# Patient Record
Sex: MALE | Race: WHITE | NOT HISPANIC OR LATINO | Employment: FULL TIME | URBAN - METROPOLITAN AREA
[De-identification: names, ages, dates, MRNs, and addresses within clinical notes are randomized per-mention and may not be internally consistent; named-entity substitution may affect disease eponyms.]

---

## 2017-01-27 ENCOUNTER — TELEPHONE (OUTPATIENT)
Dept: CARDIOLOGY | Facility: MEDICAL CENTER | Age: 67
End: 2017-01-27

## 2017-01-28 NOTE — TELEPHONE ENCOUNTER
----- Message from Kyung Deal, Med Ass't sent at 1/27/2017  2:35 PM PST -----  Regarding: Lab Question   Contact: [phone]  Patient of JW wife Ranjana would like a call back in regards to labs patient did prior to his appointment on 1/20/17 that was rescheduled due to the weather. She would like to know since he is not able to get in to see JW until 5/27/17 if new labs would  be needed. Also has a question regarding testing JW wants EDELMIRA and patient is not able to do it in [place].     Please call after 1PM.    Thank you

## 2017-01-28 NOTE — TELEPHONE ENCOUNTER
1- Please order EDELMIRA's in Epic:  I'm not sure about single or multi level-  2- When the order is done, please give to staff to fax to [facility] (if you think they might do it there)  If it has to be done in [place], wife will have to figure out how.   3- Does he need repeat lab work in May?  Patient not able to get in to see you until then.

## 2017-02-02 DIAGNOSIS — E80.4 GILBERT'S SYNDROME: ICD-10-CM

## 2017-02-02 DIAGNOSIS — N52.9 ERECTILE DYSFUNCTION, UNSPECIFIED ERECTILE DYSFUNCTION TYPE: ICD-10-CM

## 2017-02-21 DIAGNOSIS — R09.89: ICD-10-CM

## 2017-02-21 DIAGNOSIS — N52.01 ERECTILE DYSFUNCTION DUE TO ARTERIAL INSUFFICIENCY: ICD-10-CM

## 2017-02-21 DIAGNOSIS — E78.2 MIXED HYPERLIPIDEMIA: ICD-10-CM

## 2017-02-21 DIAGNOSIS — I48.0 PAF (PAROXYSMAL ATRIAL FIBRILLATION) (HCC): ICD-10-CM

## 2017-02-21 NOTE — TELEPHONE ENCOUNTER
I faxed down the form for NIH study but I still haven't seen results.  Did it get done?  They have their own form.  I will reorder in our system just in case.  What he needs before May are lipids, CBC and CMP and I ordered them. TSH and c RP don't need repeat.  I ordered that too.

## 2017-02-22 NOTE — TELEPHONE ENCOUNTER
I left a message for Ranjana to call back on the phone # below.   Dr. Thorne also sends this message:  Also he needs to tell us pronto if any arrhythmias.

## 2017-02-23 NOTE — TELEPHONE ENCOUNTER
He will get the labs done.    Wife does not know anything about NIH and what you are talking about.  She will remind him about arrhythmias.

## 2017-02-24 NOTE — TELEPHONE ENCOUNTER
Per JW:  KATIE Henderson R.N.        Caller: Unspecified (4 weeks ago)                     [facility].  ([facility])  The question was where to get ABIs I believe.

## 2017-03-01 NOTE — TELEPHONE ENCOUNTER
[facility] does not do EDELMIRA's per tech in the Cardiology dept.  Is this something that you want patient to come to [place] for?

## 2017-03-10 NOTE — TELEPHONE ENCOUNTER
Now I was trying to workup erectile dysfunction anyway and we'll just discuss it again when I see him in [place].

## 2017-03-16 NOTE — TELEPHONE ENCOUNTER
I left a message for Merrel:  No need to worry about getting the EDELMIRA testing done.  Dr. TITUS will discuss with patient in May.

## 2017-05-15 DIAGNOSIS — E80.4 GILBERT'S SYNDROME: ICD-10-CM

## 2017-05-15 DIAGNOSIS — N52.9 ERECTILE DYSFUNCTION, UNSPECIFIED ERECTILE DYSFUNCTION TYPE: ICD-10-CM

## 2017-05-17 ENCOUNTER — TELEPHONE (OUTPATIENT)
Dept: CARDIOLOGY | Facility: MEDICAL CENTER | Age: 67
End: 2017-05-17

## 2017-05-17 NOTE — TELEPHONE ENCOUNTER
----- Message from Rubi Matias R.N. sent at 5/15/2017 10:50 AM PDT -----    Borderline Bilateral PVD. FU visit with you 6/16.

## 2017-08-08 ENCOUNTER — TELEPHONE (OUTPATIENT)
Dept: CARDIOLOGY | Facility: MEDICAL CENTER | Age: 67
End: 2017-08-08

## 2017-08-08 DIAGNOSIS — E78.00 HYPERCHOLESTEROLEMIA: ICD-10-CM

## 2017-08-08 DIAGNOSIS — I48.0 PAF (PAROXYSMAL ATRIAL FIBRILLATION) (HCC): ICD-10-CM

## 2017-08-08 DIAGNOSIS — I45.6 WOLFF-PARKINSON-WHITE (WPW) SYNDROME: ICD-10-CM

## 2017-08-08 NOTE — TELEPHONE ENCOUNTER
----- Message from Vikas Thorne M.D. sent at 8/8/2017  3:14 PM PDT -----  Regarding: RE: wife wants order for cardiac scoring test  This is a perfectly reasonable thing to do to decide if his cholesterol should be treated and I did order it but I'm not sure insurance will cover it. Please let them know.  ----- Message -----     From: Mallory Maki R.N.     Sent: 8/7/2017   2:28 PM       To: Mallory Maki R.N., Vikas Thorne M.D.  Subject: FW: wife wants order for cardiac scoring test      JW,  OK to order this? He sees you 9/1/17.    ----- Message -----     From: Jina Siegel     Sent: 8/7/2017   1:48 PM       To: Mallory Maki R.N.  Subject: wife wants order for cardiac scoring test        JW/Yu      Patient's wife Jil called and wants to order a cardiac scoring test for her . She needs it faxed to [facility]. She can be reached at [phone] afternoons only.

## 2017-08-09 NOTE — TELEPHONE ENCOUNTER
I left message for Jil:  We used all the codes we could to cover the test, so if insurance is denying, that's all we can do.  Call back for questions.

## 2017-08-09 NOTE — TELEPHONE ENCOUNTER
Jina Haque R.N.                   ARIELA/Antoinette       Patient's wife Jil would like CT order faxed to [facility] in [place]. She said she spoke to Medicare to see if the test is covered, he said the code she gave him couldn't be found and told her to have the office call them to determine if they would cover the test. Medicare #[phone]. Jil can be reached at [phone] during afternoon hours only.

## 2017-08-23 DIAGNOSIS — E78.00 HYPERCHOLESTEROLEMIA: ICD-10-CM

## 2017-08-23 DIAGNOSIS — I45.6 WOLFF-PARKINSON-WHITE (WPW) SYNDROME: ICD-10-CM

## 2017-08-23 DIAGNOSIS — I48.0 PAF (PAROXYSMAL ATRIAL FIBRILLATION) (HCC): ICD-10-CM

## 2017-09-01 ENCOUNTER — OFFICE VISIT (OUTPATIENT)
Dept: CARDIOLOGY | Facility: CLINIC | Age: 67
End: 2017-09-01
Payer: MEDICARE

## 2017-09-01 VITALS
SYSTOLIC BLOOD PRESSURE: 142 MMHG | DIASTOLIC BLOOD PRESSURE: 80 MMHG | HEART RATE: 50 BPM | BODY MASS INDEX: 28.98 KG/M2 | HEIGHT: 76 IN | OXYGEN SATURATION: 98 % | WEIGHT: 238 LBS

## 2017-09-01 DIAGNOSIS — I48.0 PAF (PAROXYSMAL ATRIAL FIBRILLATION) (HCC): ICD-10-CM

## 2017-09-01 DIAGNOSIS — E78.00 HYPERCHOLESTEROLEMIA: ICD-10-CM

## 2017-09-01 DIAGNOSIS — I45.6 WOLFF-PARKINSON-WHITE (WPW) SYNDROME: ICD-10-CM

## 2017-09-01 DIAGNOSIS — I25.10 CORONARY ARTERY CALCIFICATION SEEN ON CAT SCAN: ICD-10-CM

## 2017-09-01 DIAGNOSIS — I73.9 PERIPHERAL ARTERIAL DISEASE (HCC): ICD-10-CM

## 2017-09-01 DIAGNOSIS — E78.2 MIXED HYPERLIPIDEMIA: ICD-10-CM

## 2017-09-01 PROCEDURE — 99213 OFFICE O/P EST LOW 20 MIN: CPT | Performed by: INTERNAL MEDICINE

## 2017-09-01 NOTE — LETTER
[facility]   [address] [place] [address]  Phone: [phone]  Fax: [phone]              Rhett Barrios  1950    Encounter Date: 9/1/2017    Vikas Thorne M.D.          PROGRESS NOTE:  Subjective:   Rhett Barrios is a 67 y.o. male who presents today for annual follow-up of his paroxysmal atrial fibrillation.  In the interval he has had a coronary CT scan which showed significant coronary calcification and is described below.  His ABIs were borderline abnormal but actually either stabilized or improved with exercise and he has no claudication. He has had no cardiac symptoms at all.    Past Medical History:   Diagnosis Date   • Coronary artery calcification seen on CAT scan 8/22/2017    Agatston score of 336 in left main, left anterior descending and circumflex predominately left anterior descending with none in the right coronary artery   • Peripheral arterial disease (CMS-HCC) 5/12/2017    Borderline ABIs of 0.8 on the right and 0.9 on the left, 0.9 bilaterally after exercise   • Unspecified sleep apnea 09/11/2012    evaluated by Dr. Gutierrez, [facility]   • Hyperlipidemia 2010   • Fatigue    • Gilbert's syndrome    • PAF (paroxysmal atrial fibrillation) (CMS-[facility])     remote, age 37 without recurrence, WPW found at that time, never ablated   • Taylor-Parkinson-White (WPW) syndrome     Presented with atrial fibrillation and found to have long refractory period Bright bundle at age 37 with negative coronary arteriogram then. 1st evaluated by Dr. Caal in 2010 and Dr. Doan subsequently. Referred for evaluation to Dr. Terrell at [facility], 12/2/2014,  but then decided not to go and has had no recurrent atrial fibrillation since age 37 nor any other atrial tachyarrhythmias     Past Surgical History:   Procedure Laterality Date   • KNEE ARTHROSCOPY Right    • KNEE ARTHROTOMY Left    • VASECTOMY       Family History   Problem Relation Age of Onset   •  "Cancer Mother      Lymphoma   • Heart Disease Father 80     CAD and PPM,  at 94     History   Smoking Status   • Never Smoker   Smokeless Tobacco   • Never Used     Allergies   Allergen Reactions   • Digoxin      \"Patient has WPW and was informed to not take this medicine.\"     Outpatient Encounter Prescriptions as of 2017   Medication Sig Dispense Refill   • simvastatin (ZOCOR) 20 MG Tab Take 1 Tab by mouth every evening. 30 Tab 11   • omeprazole (PRILOSEC) 20 MG delayed-release capsule Take 20 mg by mouth every day.     • aspirin EC (ECOTRIN) 81 MG TBEC Take 81 mg by mouth every day.     • Coenzyme Q10 (CO Q 10) 100 MG CAPS Take  by mouth 2 Times a Day.     • folic acid (FOLVITE) 400 MCG tablet Take 400 mcg by mouth every day.     • Calcium Citrate 200 MG TABS Take 400 mg by mouth.     • vitamin D (CHOLECALCIFEROL) 1000 UNIT TABS Take 1,000 Units by mouth every day.     • sildenafil citrate (VIAGRA) 50 MG tablet Take 50 mg by mouth as needed. As directed        No facility-administered encounter medications on file as of 2017.      Review of Systems   Respiratory: Negative for cough and wheezing.    Cardiovascular: Negative for orthopnea and PND.   Musculoskeletal: Negative for myalgias.   Psychiatric/Behavioral: Negative for substance abuse.        Objective:   /80   Pulse (!) 50   Ht 1.93 m (6' 4\")   Wt 108 kg (238 lb)   SpO2 98%   BMI 28.97 kg/m²      Physical Exam   Constitutional: He is oriented to person, place, and time. He appears well-developed and well-nourished.   Eyes: Conjunctivae are normal. No scleral icterus.   Neck: No JVD present.   Cardiovascular: Normal rate, regular rhythm, S1 normal, S2 normal and normal pulses.  PMI is not displaced.  Exam reveals no gallop.    No murmur heard.  Pulses:       Carotid pulses are 2+ on the right side, and 2+ on the left side.       Radial pulses are 2+ on the right side, and 2+ on the left side.        Dorsalis pedis pulses are 2+ on the " right side, and 2+ on the left side.        Posterior tibial pulses are 2+ on the right side, and 2+ on the left side.   No carotid bruits   Pulmonary/Chest: Effort normal and breath sounds normal.   Musculoskeletal: He exhibits no edema.   Neurological: He is alert and oriented to person, place, and time.   Skin: Skin is warm and dry.   Psychiatric: He has a normal mood and affect. His behavior is normal. Judgment and thought content normal.       Assessment:     1. Coronary artery calcification seen on CAT scan     2. PAF (paroxysmal atrial fibrillation) (CMS-HCC)     3. Hypercholesterolemia     4. Taylor-Parkinson-White (WPW) syndrome     5. Peripheral arterial disease (CMS-HCC)       He has had no arrhythmia and never any symptoms of peripheral arterial disease. We discussed the coronary calcification findings which would require invasive evaluation if any symptoms at all. Recall that his normal coronary angiogram was 30 years ago at the time of his initial presentation with atrial fibrillation.  This definitely requires treatment of his lipids to secondary prevention targets.  Medical Decision Making:  Today's Assessment / Status / Plan:     Increase simvastatin to 40 mg daily promptly. Follow-up lab in 6 weeks and phone follow-up of that but immediate reevaluation if any symptoms. Continue primary follow with Kailyn NAIR.  He and his wife are well versed in the use of the emergency medical system.      No Recipients

## 2017-09-02 PROBLEM — I73.9 PERIPHERAL ARTERIAL DISEASE (HCC): Status: ACTIVE | Noted: 2017-05-12

## 2017-09-02 PROBLEM — I25.10 CORONARY ARTERY CALCIFICATION SEEN ON CAT SCAN: Status: ACTIVE | Noted: 2017-08-22

## 2017-09-02 RX ORDER — SIMVASTATIN 40 MG
40 TABLET ORAL EVERY EVENING
Qty: 90 TAB | Refills: 3 | Status: SHIPPED | OUTPATIENT
Start: 2017-09-02 | End: 2017-12-28

## 2017-09-02 ASSESSMENT — ENCOUNTER SYMPTOMS
PND: 0
COUGH: 0
WHEEZING: 0
MYALGIAS: 0
ORTHOPNEA: 0

## 2017-09-02 ASSESSMENT — LIFESTYLE VARIABLES: SUBSTANCE_ABUSE: 0

## 2017-09-02 NOTE — PROGRESS NOTES
"Subjective:   Rhett Barrios is a 67 y.o. male who presents today for annual follow-up of his paroxysmal atrial fibrillation.  In the interval he has had a coronary CT scan which showed significant coronary calcification and is described below.  His ABIs were borderline abnormal but actually either stabilized or improved with exercise and he has no claudication. He has had no cardiac symptoms at all.    Past Medical History:   Diagnosis Date   • Coronary artery calcification seen on CAT scan 2017    Agatston score of 336 in left main, left anterior descending and circumflex predominately left anterior descending with none in the right coronary artery   • Peripheral arterial disease (CMS-HCC) 2017    Borderline ABIs of 0.8 on the right and 0.9 on the left, 0.9 bilaterally after exercise   • Unspecified sleep apnea 2012    evaluated by Dr. Gutierrez, [facility]   • Hyperlipidemia    • Fatigue    • Gilbert's syndrome    • PAF (paroxysmal atrial fibrillation) (CMS-HCC)     remote, age 37 without recurrence, WPW found at that time, never ablated   • Taylor-Parkinson-White (WPW) syndrome     Presented with atrial fibrillation and found to have long refractory period Bright bundle at age 37 with negative coronary arteriogram then. 1st evaluated by Dr. Caal in  and Dr. Doan subsequently. Referred for evaluation to Dr. Terrell at [facility], 2014,  but then decided not to go and has had no recurrent atrial fibrillation since age 37 nor any other atrial tachyarrhythmias     Past Surgical History:   Procedure Laterality Date   • KNEE ARTHROSCOPY Right    • KNEE ARTHROTOMY Left    • VASECTOMY       Family History   Problem Relation Age of Onset   • Cancer Mother      Lymphoma   • Heart Disease Father 80     CAD and PPM,  at 94     History   Smoking Status   • Never Smoker   Smokeless Tobacco   • Never Used     Allergies   Allergen Reactions   • Digoxin      \"Patient has WPW and was informed to not take this " "medicine.\"     Outpatient Encounter Prescriptions as of 9/1/2017   Medication Sig Dispense Refill   • simvastatin (ZOCOR) 20 MG Tab Take 1 Tab by mouth every evening. 30 Tab 11   • omeprazole (PRILOSEC) 20 MG delayed-release capsule Take 20 mg by mouth every day.     • aspirin EC (ECOTRIN) 81 MG TBEC Take 81 mg by mouth every day.     • Coenzyme Q10 (CO Q 10) 100 MG CAPS Take  by mouth 2 Times a Day.     • folic acid (FOLVITE) 400 MCG tablet Take 400 mcg by mouth every day.     • Calcium Citrate 200 MG TABS Take 400 mg by mouth.     • vitamin D (CHOLECALCIFEROL) 1000 UNIT TABS Take 1,000 Units by mouth every day.     • sildenafil citrate (VIAGRA) 50 MG tablet Take 50 mg by mouth as needed. As directed        No facility-administered encounter medications on file as of 9/1/2017.      Review of Systems   Respiratory: Negative for cough and wheezing.    Cardiovascular: Negative for orthopnea and PND.   Musculoskeletal: Negative for myalgias.   Psychiatric/Behavioral: Negative for substance abuse.        Objective:   /80   Pulse (!) 50   Ht 1.93 m (6' 4\")   Wt 108 kg (238 lb)   SpO2 98%   BMI 28.97 kg/m²   Blood pressure was 135/80 at the conclusion of the exam  Physical Exam   Constitutional: He is oriented to person, place, and time. He appears well-developed and well-nourished.   Eyes: Conjunctivae are normal. No scleral icterus.   Neck: No JVD present.   Cardiovascular: Normal rate, regular rhythm, S1 normal, S2 normal and normal pulses.  PMI is not displaced.  Exam reveals no gallop.    No murmur heard.  Pulses:       Carotid pulses are 2+ on the right side, and 2+ on the left side.       Radial pulses are 2+ on the right side, and 2+ on the left side.        Dorsalis pedis pulses are 2+ on the right side, and 2+ on the left side.        Posterior tibial pulses are 2+ on the right side, and 2+ on the left side.   No carotid bruits   Pulmonary/Chest: Effort normal and breath sounds normal. "   Musculoskeletal: He exhibits no edema.   Neurological: He is alert and oriented to person, place, and time.   Skin: Skin is warm and dry.   Psychiatric: He has a normal mood and affect. His behavior is normal. Judgment and thought content normal.       Assessment:     1. Coronary artery calcification seen on CAT scan     2. PAF (paroxysmal atrial fibrillation) (CMS-HCC)     3. Hypercholesterolemia     4. Taylor-Parkinson-White (WPW) syndrome     5. Peripheral arterial disease (CMS-HCC)       He has had no arrhythmia and never any symptoms of peripheral arterial disease. We discussed the coronary calcification findings which would require invasive evaluation if any symptoms at all. Recall that his normal coronary angiogram was 30 years ago at the time of his initial presentation with atrial fibrillation.  This definitely requires treatment of his lipids to secondary prevention targets.  Medical Decision Making:  Today's Assessment / Status / Plan:     Increase simvastatin to 40 mg daily promptly. Follow-up lab in 6 weeks and phone follow-up of that but immediate reevaluation if any symptoms. Continue primary follow with Kailyn NAIR.  He and his wife are well versed in the use of the emergency medical system.

## 2017-09-11 DIAGNOSIS — E78.00 HYPERCHOLESTEROLEMIA: ICD-10-CM

## 2017-12-27 ENCOUNTER — TELEPHONE (OUTPATIENT)
Dept: CARDIOLOGY | Facility: MEDICAL CENTER | Age: 67
End: 2017-12-27

## 2017-12-28 ENCOUNTER — TELEPHONE (OUTPATIENT)
Dept: CARDIOLOGY | Facility: MEDICAL CENTER | Age: 67
End: 2017-12-28

## 2017-12-28 DIAGNOSIS — E78.00 HYPERCHOLESTEROLEMIA: ICD-10-CM

## 2017-12-28 RX ORDER — ATORVASTATIN CALCIUM 40 MG/1
40 TABLET, FILM COATED ORAL DAILY
Qty: 90 TAB | Refills: 3 | Status: SHIPPED | OUTPATIENT
Start: 2017-12-28 | End: 2019-03-21 | Stop reason: SDUPTHER

## 2017-12-28 NOTE — TELEPHONE ENCOUNTER
----- Message from Kendall Deal sent at 12/27/2017  3:08 PM PST -----  Regarding: wants to know if medication needs to be adjsuted with recent lab results.  Contact: [phone]  ARIELA/Antoinette Viveros's wife, Jil fuentes wants to know if his medication needs to be adjsuted with recent lab results. Please call Rhett to advise at [phone].

## 2017-12-29 NOTE — TELEPHONE ENCOUNTER
He should switch to atorvastatin and get f/u lab and I ordered both.  He should folow up with Kailyn Andre.

## 2017-12-31 ENCOUNTER — TELEPHONE (OUTPATIENT)
Dept: CARDIOLOGY | Facility: MEDICAL CENTER | Age: 67
End: 2017-12-31

## 2017-12-31 NOTE — TELEPHONE ENCOUNTER
I finally left a message on his office line because I was never able to get through on the other line and I presume you will be calling him next week to confirm the plan that we outlined in the previous note. Thanks.

## 2018-05-07 ENCOUNTER — TELEPHONE (OUTPATIENT)
Dept: PULMONOLOGY | Facility: HOSPICE | Age: 68
End: 2018-05-07

## 2018-05-07 DIAGNOSIS — R06.00 DYSPNEA, UNSPECIFIED TYPE: ICD-10-CM

## 2018-05-15 ENCOUNTER — HOSPITAL ENCOUNTER (OUTPATIENT)
Dept: RADIOLOGY | Facility: MEDICAL CENTER | Age: 68
End: 2018-05-15

## 2018-05-30 ENCOUNTER — NON-PROVIDER VISIT (OUTPATIENT)
Dept: PULMONOLOGY | Facility: HOSPICE | Age: 68
End: 2018-05-30
Payer: MEDICARE

## 2018-05-30 ENCOUNTER — OFFICE VISIT (OUTPATIENT)
Dept: PULMONOLOGY | Facility: HOSPICE | Age: 68
End: 2018-05-30
Payer: MEDICARE

## 2018-05-30 VITALS
HEART RATE: 64 BPM | SYSTOLIC BLOOD PRESSURE: 120 MMHG | HEIGHT: 75 IN | BODY MASS INDEX: 31.08 KG/M2 | DIASTOLIC BLOOD PRESSURE: 80 MMHG | OXYGEN SATURATION: 96 % | RESPIRATION RATE: 16 BRPM | WEIGHT: 250 LBS | TEMPERATURE: 97.6 F

## 2018-05-30 VITALS — HEIGHT: 75 IN | WEIGHT: 250 LBS | BODY MASS INDEX: 31.08 KG/M2

## 2018-05-30 DIAGNOSIS — R06.83 SNORING: ICD-10-CM

## 2018-05-30 DIAGNOSIS — R93.89 ABNORMAL CHEST X-RAY: ICD-10-CM

## 2018-05-30 DIAGNOSIS — R06.00 DYSPNEA, UNSPECIFIED TYPE: ICD-10-CM

## 2018-05-30 PROCEDURE — 94060 EVALUATION OF WHEEZING: CPT | Performed by: INTERNAL MEDICINE

## 2018-05-30 PROCEDURE — 94729 DIFFUSING CAPACITY: CPT | Performed by: INTERNAL MEDICINE

## 2018-05-30 PROCEDURE — 99204 OFFICE O/P NEW MOD 45 MIN: CPT | Mod: 25 | Performed by: INTERNAL MEDICINE

## 2018-05-30 PROCEDURE — 94726 PLETHYSMOGRAPHY LUNG VOLUMES: CPT | Performed by: INTERNAL MEDICINE

## 2018-05-30 ASSESSMENT — PULMONARY FUNCTION TESTS
FVC_PREDICTED: 5.43
FEV1_PREDICTED: 4.05
FEV1: 4.77
FEV1/FVC: 79
FEV1/FVC_PERCENT_PREDICTED: 106
FVC_PERCENT_PREDICTED: 113
FEV1/FVC: 77
FEV1_PERCENT_CHANGE: 2
FEV1_PERCENT_PREDICTED: 117
FVC_LLN: 4.53
FEV1: 4.89
FEV1/FVC_PERCENT_CHANGE: 2
FEV1_PERCENT_CHANGE: 0
FEV1/FVC_PERCENT_PREDICTED: 104
FEV1/FVC_PREDICTED: 74
FVC_PERCENT_PREDICTED: 114
FEV1/FVC_PERCENT_PREDICTED: 105
FEV1/FVC: 79
FEV1/FVC_PERCENT_PREDICTED: 75
FEV1/FVC_PERCENT_LLN: 62
FEV1_LLN: 3.38
FEV1_PERCENT_PREDICTED: 120
FEV1/FVC: 77
FVC: 6.18
FVC: 6.19
FEV1/FVC_PERCENT_PREDICTED: 104

## 2018-05-30 NOTE — PROCEDURES
Technician Lidia Holland, [facility] Comments:Good patient effort & cooperation.  The results of this test meet the ATS/ERS standards for acceptability & reproducibility.  Test was performed on the [employer] Body Plethysmograph-Elite DX system.  Predicted values were [facility]-3 for spirometry, [facility] for DLCO, ITS for Lung Volumes.  The DLCO was uncorrected for Hgb.  A bronchodilator of Ventolin HFA -2puffs via spacer administered.  DLCO performed during dilation period.    Interpretation:    SPIROMETRY:  1. FVC was 6.19 L, 114 % of predicted  2. FEV1 was 4.89 L, 120 % of predicted   3. FEV1/FVC ratio was 79 %  4. There was no significant response to bronchodilators   5. Flow volume loop normal shape and size    LUNG VOLUMES:  1. RV was 128 % of predicted   2. TLC was 120 % of predicted       DIFFUSION CAPACITY:  1.Diffusion capacity was 152 % of predicted       IMPRESSION:  Other than marked increase in diffusion capacity the patient has normal pulmonary function test. Clinical correlation is required

## 2018-05-31 NOTE — PROGRESS NOTES
CC:  Chief Complaint   Patient presents with   • New Patient     Adnormal Radiology findings     Abnormal CT scan    HPI:   The patient is a 68 y.o. male with known history of lung disease. No history of smoking and no history of occupational exposure was referred to our clinic today for to be evaluated for abnormal CT scan. The patient initially had chest x-ray which showed bibasilar interstitial abnormalities. These findings existed since 2011. Subsequent CT scan done a few weeks ago showed bibasilar micro-nodules with calcifications. These images were personally reviewed.    The patient had pulmonary function tests done today which was unremarkable.    Symptomatically the patient has some cough for the last 2 years or so. He thought his cough is related to postnasal drips. He is being seen by ENT for that. Cough is mostly dry however sometimes he gets white phlegm. The patient's lifelong nonsmoker.    [place] moved to many states when he was child because his parents here in the . He never lived in a farm. He works as a  with no history of occupational exposure.    The patient has 2 brothers who had abnormal findings on there chest images however they have history of smoking and the patient is not aware exactly what abnormalities they have.    ROS:   Constitutional: Denies fevers, chills, night sweats  Eyes: Denies vision loss, pain, drainage, double vision  Ears, Nose, Throat: Denies earache, difficulty hearing, tinnitus, nasal congestion, hoarseness  Cardiovascular: Denies chest pain, tightness, palpitations, orthopnea or edema  Respiratory: Please see history of present illness  Sleep: The patient snores at night, he has also witnessed sleep apneas by his wife.  GI: Denies heartburn, dysphagia, nausea, abdominal pain, diarrhea or constipation  : Denies frequent urination, hematuria, discharge or painful urination  Musculoskeletal: Denies back pain, painful  joints, sore muscles  Neurological: Denies weakness or headaches  Skin: No rashes  All other ROS were negative except what mentioned in the HPI     Past Medical History:  Past Medical History:   Diagnosis Date   • Apnea, sleep    • Atrial fibrillation (HCC)    • Back pain    • Chickenpox    • Coronary artery calcification seen on CAT scan 8/22/2017    Agatston score of 336 in left main, left anterior descending and circumflex predominately left anterior descending with none in the right coronary artery   • Cough    • Fatigue    • GERD (gastroesophageal reflux disease)    • [place] measles    • Gilbert's syndrome    • Hyperlipidemia 2010   • Influenza    • Kidney stone    • Mumps    • Nasal drainage    • PAF (paroxysmal atrial fibrillation) ([facility])     remote, age 37 without recurrence, WPW found at that time, never ablated   • Peripheral arterial disease ([facility]) 5/12/2017    Borderline ABIs of 0.8 on the right and 0.9 on the left, 0.9 bilaterally after exercise   • Pneumonia    • Snoring    • Sputum production    • Unspecified sleep apnea 09/11/2012    evaluated by Dr. Gutierrez, [facility]   • Taylor-Parkinson-White (WPW) syndrome     Presented with atrial fibrillation and found to have long refractory period Bright bundle at age 37 with negative coronary arteriogram then. 1st evaluated by Dr. Caal in 2010 and Dr. Doan subsequently. Referred for evaluation to Dr. Terrell at [facility], 12/2/2014,  but then decided not to go and has had no recurrent atrial fibrillation since age 37 nor any other atrial tachyarrhythmias               Social History:  Social History     Social History   • Marital status:      Spouse name: N/A   • Number of children: N/A   • Years of education: N/A     Occupational History   • Not on file.     Social History Main Topics   • Smoking status: Never Smoker   • Smokeless tobacco: Never Used   • Alcohol use Yes      Comment: Very rarely   • Drug use: No   • Sexual activity: Not on file     Other Topics  "Concern   • Not on file     Social History Narrative   • No narrative on file       Occupational History   Patient is a  the patient works as a . No occupational exposure        Family History:  Family History   Problem Relation Age of Onset   • Cancer Mother      Lymphoma   • Heart Disease Father 80     CAD and PPM,  at 94       Current Outpatient Prescriptions on File Prior to Visit   Medication Sig Dispense Refill   • atorvastatin (LIPITOR) 40 MG Tab Take 1 Tab by mouth every day. 90 Tab 3   • omeprazole (PRILOSEC) 20 MG delayed-release capsule Take 20 mg by mouth every day.     • aspirin EC (ECOTRIN) 81 MG TBEC Take 81 mg by mouth every day.     • Coenzyme Q10 (CO Q 10) 100 MG CAPS Take  by mouth 2 Times a Day.     • vitamin D (CHOLECALCIFEROL) 1000 UNIT TABS Take 1,000 Units by mouth every day.     • folic acid (FOLVITE) 400 MCG tablet Take 400 mcg by mouth every day.     • Calcium Citrate 200 MG TABS Take 400 mg by mouth.     • sildenafil citrate (VIAGRA) 50 MG tablet Take 50 mg by mouth as needed. As directed        No current facility-administered medications on file prior to visit.        Allergies:   Digoxin        Vitals:    18 1442   Height: 1.905 m (6' 3\")   Weight: 113.4 kg (250 lb)   Weight % change since last entry.: 0 %   BP: 120/80   Pulse: 64   BMI (Calculated): 31.25   Resp: 16   Temp: 36.4 °C (97.6 °F)           Physical Exam:  Appearance:  in no acute distress  HEENT: Normocephalic, atraumatic, white sclera, PERRLA, oropharynx clear  Neck: No adenopathy or masses  Respiratory: no intercostal retractions or accessory muscle use  Lungs auscultation: Clear to auscultation bilaterally  Cardiovascular: Regular rate rhythm. No murmurs, rubs or gallops.  No LE edema  Abdomen: soft, nondistended  Gait: Normal  Digits: No clubbing, cyanosis  Motor: No focal deficits  Orientation: Oriented to time, person and place      DATA:    Labs:  No results found for: WBC, RBC, HEMOGLOBIN, " HEMATOCRIT, MCV, MCH, MCHC, MPV, NEUTSPOLYS, LYMPHOCYTES, MONOCYTES, EOSINOPHILS, BASOPHILS, HYPOCHROMIA, ANISOCYTOSIS   No results found for: SODIUM, POTASSIUM, CHLORIDE, CO2, GLUCOSE, BUN, CREATININE, BUNCREATRAT, GLOMRATE           PULMONARY FUNCTION TEST:  Please see history of present illness    CT chest  Bilateral lower lobe micro-nodular pattern with associated microcalcification differential diagnoses include pulmonary alveolar microlithiasis, sarcoidosis, amyloidosis.    Diagnosis:  1. Abnormal chest x-ray  AMB PULMONARY FUNCTION TEST/LAB    DX-CHEST-2 VIEWS    CANCELED: DX-CHEST-2 VIEWS   2. Snoring  REFERRAL TO OTHER        Assessment and Plan   The patient has abnormal CT scan results as mentioned above. The only respiratory symptoms he has his cough mostly with throat irritation and he thinks it is secondary to postnasal drips.  Pulmonary function test was normal. The patient does not have any shortness of breath or dyspnea on exertion.  I discussed in details with the patient and his wife all her options of diagnostic workup including lung biopsy however because the patient is asymptomatic and because of the fact that these findings are probably chronic and seen on chest x-ray done in 2011(chest x-ray is not available for me to review)  We decided to go on a conservative route and repeat chest x-ray in few months with pulmonary function tests if there is no change in the repeat x-ray and the pulmonary function tests continue to be normal probably no further workup was needed.    Regarding the patient's signs and symptoms of obstructive sleep apnea, I referred him to the sleep clinic.                      Return in about 6 months (around 11/30/2018) for With PFT, Follow up visit with APRN, With CXR.        This note was created using voice recognition software. I apologize for any overlooked obvious grammar or  vocabulary mistake

## 2018-11-29 ENCOUNTER — TELEPHONE (OUTPATIENT)
Dept: CARDIOLOGY | Facility: MEDICAL CENTER | Age: 68
End: 2018-11-29

## 2018-11-29 DIAGNOSIS — E78.00 HYPERCHOLESTEROLEMIA: ICD-10-CM

## 2018-11-29 DIAGNOSIS — I73.9 PERIPHERAL ARTERIAL DISEASE (HCC): ICD-10-CM

## 2018-11-29 NOTE — TELEPHONE ENCOUNTER
Question about having lab work done for upcoming appt   Received: Today Message Contents   DARRION Overton/Martha     Patient's wife, Jil, wants a call back at [phone]. He  has a new patient appt on 3/21/19 with Dr Paz and she wants to find out if he needs to have lab work done before the appt.        Returned Jil's phone call.  She is requesting lab slips be faxed to her person fax.  She states that they live in [place] and they use the local lab to draw blood.  Fax number given to this RN.  Fax: [phone]  She states no other concerns or questions at this time and is appreciative of information given.      CMP and Lipids ordered.  Lab slips printed and faxed to Jil, [phone], completed status.

## 2019-03-02 NOTE — TELEPHONE ENCOUNTER
----- Message from Vikas Thorne M.D. sent at 12/28/2017  4:30 PM PST -----  Regarding: RE: LAB IS IN MEDIA  Contact: [phone]  Thank you.  Please let them know the LDL is still too high.  I think we should switch to atorva and check lab in 6-8 weeks.  I sent it to [facility] and ordered the lab.  He should f/u with Kailyn Andre.   ----- Message -----  From: Pau Haque R.N.  Sent: 12/28/2017   8:31 AM  To: Vikas Thorne M.D.  Subject: LAB IS IN MEDIA                                      ----- Message -----  From: Vikas Thorne M.D.  Sent: 12/27/2017   5:43 PM  To: Pau Haque R.N.  Subject: RE: What is the lipid plan?                      I have not yet been able to get the lab results done. I presume they were done at [facility]. Please see if you can find them and get them.  ----- Message -----  From: Pau Haque R.N.  Sent: 12/21/2017   4:50 PM  To: Vikas Thorne M.D.  Subject: What is the lipid plan?                              ----- Message -----  From: Angie Quiles  Sent: 12/21/2017   4:03 PM  To: Pau Haque R.N.  Subject: lab results and simvastatin                      ARIELA/grant    Pt's wife Jil calling for lab results & what JW's recommendations are.  Jil also wants to discuss getting pt's simvastatin organized before JW retires prior to seeing new cardiologist.  Please call Rhett to discuss both items, cell # [phone]       no weight-bearing restrictions

## 2019-03-13 DIAGNOSIS — I73.9 PERIPHERAL ARTERIAL DISEASE (HCC): ICD-10-CM

## 2019-03-13 DIAGNOSIS — E78.00 HYPERCHOLESTEROLEMIA: ICD-10-CM

## 2019-03-14 ENCOUNTER — TELEPHONE (OUTPATIENT)
Dept: CARDIOLOGY | Facility: MEDICAL CENTER | Age: 69
End: 2019-03-14

## 2019-03-21 ENCOUNTER — TELEPHONE (OUTPATIENT)
Dept: CARDIOLOGY | Facility: MEDICAL CENTER | Age: 69
End: 2019-03-21

## 2019-03-21 ENCOUNTER — OFFICE VISIT (OUTPATIENT)
Dept: CARDIOLOGY | Facility: MEDICAL CENTER | Age: 69
End: 2019-03-21
Payer: MEDICARE

## 2019-03-21 VITALS
BODY MASS INDEX: 31.95 KG/M2 | DIASTOLIC BLOOD PRESSURE: 86 MMHG | WEIGHT: 257 LBS | SYSTOLIC BLOOD PRESSURE: 148 MMHG | HEART RATE: 66 BPM | OXYGEN SATURATION: 95 % | HEIGHT: 75 IN

## 2019-03-21 DIAGNOSIS — I48.0 PAF (PAROXYSMAL ATRIAL FIBRILLATION) (HCC): ICD-10-CM

## 2019-03-21 DIAGNOSIS — E78.00 HYPERCHOLESTEROLEMIA: ICD-10-CM

## 2019-03-21 DIAGNOSIS — E78.5 DYSLIPIDEMIA: ICD-10-CM

## 2019-03-21 DIAGNOSIS — I45.6 WOLFF-PARKINSON-WHITE (WPW) SYNDROME: ICD-10-CM

## 2019-03-21 DIAGNOSIS — I25.10 CORONARY ARTERY CALCIFICATION SEEN ON CAT SCAN: ICD-10-CM

## 2019-03-21 PROCEDURE — 99214 OFFICE O/P EST MOD 30 MIN: CPT | Performed by: INTERNAL MEDICINE

## 2019-03-21 RX ORDER — ATORVASTATIN CALCIUM 40 MG/1
40 TABLET, FILM COATED ORAL DAILY
Qty: 90 TAB | Refills: 3 | Status: SHIPPED | OUTPATIENT
Start: 2019-03-21 | End: 2019-04-01 | Stop reason: SDUPTHER

## 2019-03-21 RX ORDER — UBIDECARENONE 75 MG
100 CAPSULE ORAL DAILY
COMMUNITY
End: 2021-04-16

## 2019-03-21 RX ORDER — RANITIDINE 300 MG/1
TABLET ORAL
COMMUNITY
Start: 2019-02-25 | End: 2020-03-12

## 2019-03-21 RX ORDER — OMEPRAZOLE 40 MG/1
40 CAPSULE, DELAYED RELEASE ORAL DAILY
COMMUNITY
Start: 2019-01-30

## 2019-03-21 ASSESSMENT — ENCOUNTER SYMPTOMS
FOCAL WEAKNESS: 0
CLAUDICATION: 0
WEAKNESS: 0
PALPITATIONS: 0
SHORTNESS OF BREATH: 0
PND: 0
FEVER: 0
ABDOMINAL PAIN: 0
FALLS: 0
BRUISES/BLEEDS EASILY: 0
SORE THROAT: 0
DIZZINESS: 0
BLURRED VISION: 0
NAUSEA: 0
COUGH: 0
CHILLS: 0

## 2019-03-21 NOTE — PROGRESS NOTES
Chief Complaint   Patient presents with   • Hyperlipidemia       Subjective:   Rhett Barrios is a 69 y.o. male who presents today for follow-up of his history of A. fib with WPW and age 37 with coronary disease based on coronary calcification but normal stress testing he did have a false positive thallium remotely    He is been doing well since his last follow-up he was hoping to establish cardiology care locally in [place] and he had a echocardiogram which was normal a stress test which was reportedly normal blood work but overall preferred to stay in contact with [facility] he lives in [place]    He is doing well with his medications he was appropriately switched from simvastatin to atorvastatin recent LDL was below 100    Past Medical History:   Diagnosis Date   • Apnea, sleep    • Atrial fibrillation (HCC)    • Back pain    • Chickenpox    • Coronary artery calcification seen on CAT scan 8/22/2017    Agatston score of 336 in left main, left anterior descending and circumflex predominately left anterior descending with none in the right coronary artery   • Cough    • Fatigue    • GERD (gastroesophageal reflux disease)    • [place] measles    • Gilbert's syndrome    • Hyperlipidemia 2010   • Influenza    • Kidney stone    • Mumps    • Nasal drainage    • PAF (paroxysmal atrial fibrillation) (HCC)     remote, age 37 without recurrence, WPW found at that time, never ablated   • Peripheral arterial disease ([facility]) 5/12/2017    Borderline ABIs of 0.8 on the right and 0.9 on the left, 0.9 bilaterally after exercise   • Pneumonia    • Snoring    • Sputum production    • Unspecified sleep apnea 09/11/2012    evaluated by Dr. Gutierrez, [facility]   • Taylor-Parkinson-White (WPW) syndrome     Presented with atrial fibrillation and found to have long refractory period Bright bundle at age 37 with negative coronary arteriogram then. 1st evaluated by Dr. Caal in 2010 and Dr. Doan subsequently. Referred for evaluation to   "[facility], 2014,  but then decided not to go and has had no recurrent atrial fibrillation since age 37 nor any other atrial tachyarrhythmias     Past Surgical History:   Procedure Laterality Date   • ARTHROSCOPY, KNEE     • KNEE ARTHROSCOPY Right    • KNEE ARTHROTOMY Left    • VASECTOMY       Family History   Problem Relation Age of Onset   • Cancer Mother         Lymphoma   • Heart Disease Father 80        CAD and PPM,  at 94     Social History     Social History   • Marital status:      Spouse name: N/A   • Number of children: N/A   • Years of education: N/A     Occupational History   • Not on file.     Social History Main Topics   • Smoking status: Never Smoker   • Smokeless tobacco: Never Used   • Alcohol use Yes      Comment: Very rarely   • Drug use: No   • Sexual activity: Not on file     Other Topics Concern   • Not on file     Social History Narrative   • No narrative on file     Allergies   Allergen Reactions   • Digoxin Unspecified     \"Patient has WPW and was informed to not take this medicine.\"     Outpatient Encounter Prescriptions as of 3/21/2019   Medication Sig Dispense Refill   • omeprazole (PRILOSEC) 40 MG delayed-release capsule      • ranitidine (ZANTAC) 300 MG tablet      • cyanocobalamin (VITAMIN B-12) 100 MCG Tab Take 100 mcg by mouth every day.     • Flaxseed-Priscila Prim-Borage (FLAX OIL XTRA) Cap Take  by mouth.     • atorvastatin (LIPITOR) 40 MG Tab Take 1 Tab by mouth every day. 90 Tab 3   • Ascorbic Acid (ANEUDY-C PO) Take  by mouth.     • aspirin EC (ECOTRIN) 81 MG TBEC Take 81 mg by mouth every day.     • Coenzyme Q10 (CO Q 10) 100 MG CAPS Take  by mouth 2 Times a Day.     • Calcium Citrate 200 MG TABS Take 400 mg by mouth.     • vitamin D (CHOLECALCIFEROL) 1000 UNIT TABS Take 1,000 Units by mouth every day.     • sildenafil citrate (VIAGRA) 50 MG tablet Take 50 mg by mouth as needed. As directed      • [DISCONTINUED] VITAMIN E PO Take  by mouth.     • " "[DISCONTINUED] atorvastatin (LIPITOR) 40 MG Tab Take 1 Tab by mouth every day. 90 Tab 3   • [DISCONTINUED] omeprazole (PRILOSEC) 20 MG delayed-release capsule Take 20 mg by mouth every day.     • [DISCONTINUED] folic acid (FOLVITE) 400 MCG tablet Take 400 mcg by mouth every day.       No facility-administered encounter medications on file as of 3/21/2019.      Review of Systems   Constitutional: Negative for chills and fever.   HENT: Negative for sore throat.    Eyes: Negative for blurred vision.   Respiratory: Negative for cough and shortness of breath.    Cardiovascular: Negative for chest pain, palpitations, claudication, leg swelling and PND.   Gastrointestinal: Negative for abdominal pain and nausea.   Musculoskeletal: Negative for falls and joint pain.   Skin: Negative for rash.   Neurological: Negative for dizziness, focal weakness and weakness.   Endo/Heme/Allergies: Does not bruise/bleed easily.        Objective:   /86 (BP Location: Right arm, Patient Position: Sitting)   Pulse 66   Ht 1.905 m (6' 3\")   Wt 116.6 kg (257 lb)   SpO2 95%   BMI 32.12 kg/m²     Physical Exam   Constitutional: No distress.   HENT:   Mouth/Throat: Oropharynx is clear and moist. No oropharyngeal exudate.   Eyes: No scleral icterus.   Neck: No JVD present.   Cardiovascular: Normal rate and normal heart sounds.  Exam reveals no gallop and no friction rub.    No murmur heard.  Pulmonary/Chest: No respiratory distress. He has no wheezes. He has no rales.   Abdominal: Soft. Bowel sounds are normal.   Musculoskeletal: He exhibits no edema.   Neurological: He is alert.   Skin: No rash noted. He is not diaphoretic.   Psychiatric: He has a normal mood and affect.     We reviewed his extensive records going all the way back to his initial presentation with ELSIE baires with WPW    We reviewed the images of his coronary CAT scan with moderate coronary artery calcifications    We reviewed his recent labs which are essentially normal with " good lipid control    Reviewed the report of his echocardiogram which I had read from [facility] last year which was normal and his stress ECG was reported to have some ST changes apparently nuclear imaging was normal    Assessment:     1. Taylor-Parkinson-White (WPW) syndrome     2. PAF (paroxysmal atrial fibrillation) (HCC)     3. Coronary artery calcification seen on CAT scan     4. Hypercholesterolemia  atorvastatin (LIPITOR) 40 MG Tab   5. Dyslipidemia         Medical Decision Making:  Today's Assessment / Status / Plan:     It was my pleasure to meet with Mr. Barrios.    He is accompanied by his wife    He is done well for his history of A. fib with WPW and encouraged him to inform the emergency room staff should he ever present with recurrent A. fib that he also has WPW    He is on appropriate statin.    If he were to have symptoms of angina or NSTEMI certainly would recommend coronary angiogram rather than repeat stress testing    I will see Mr. Barrios back in 1 year time and encouraged him to follow up with us over the phone or e-mail using my MyChart as issues arise.    It is my pleasure to participate in the care of Mr. Barrios.  Please do not hesitate to contact me with questions or concerns.    Sudeep Paz MD PhD FACC  Cardiologist [facility]    Please note that this dictation was created using voice recognition software. I have worked with consultants from the vendor as well as technical experts from [facility] to optimize the interface. I have made every reasonable attempt to correct obvious errors, but I expect that there are errors of grammar and possibly content I did not discover before finalizing the note.

## 2019-03-21 NOTE — PATIENT INSTRUCTIONS
Please look into the following diets and incorporate them into your diet    LOW SALT DIET   KEEP YOUR SODIUM EQUAL TO CALORIES AND NO MORE THAN DOUBLE THE CALORIES FOR A LOW SALT DIET    FOR TREATMENT OR PREVENTION OF CORONARY ARTERY DISEASE    Juma - [facility]    Dr. Banuelos[facility] for Reversing Heart Disease - [facility] Cardiologist    [facility]    Dr Vail - [facility] (book and documentary)      FOR TREATMENT OF BLOOD PRESSURE  DASH DIET - American Heart Association for treatment of HYPERTENSION    FOR TREATMENT OF BAD CHOLESTEROL/FATS  REDUCE PROCESSED SUGAR AS MUCH AS POSSIBLE  INCREASE WHOLE GRAINS/VEGETABLES    Lowering total cholesterol and LDL (bad) cholesterol:  - Eat leaner cuts of meat, or eliminate altogether if possible red meat, and frequently substitute fish or chicken.  - Limit saturated fat to no more than 7-10% of total calories no more than 10 g per day is recommended. Some sources of saturated fat include butter, animal fats, hydrogenated vegetable fats and oils, many desserts, whole milk dairy products.  - Replaced saturated fats with polyunsaturated fats and monounsaturated fats. Foods high in monounsaturated fat include nuts, although well, canola oil, avocados, and olives.  - Limit trans fat (processed foods) and replaced with fresh fruits and vegetables  - Recommend nonfat dairy products  - Increase substantially the amount of soluble fiber intake (legumes such as beans, fruit, whole grains).  - Consider nutritional supplements: plant sterile spreads such as Benecol, fish oil,  flaxseed oil, omega-3 acids capsules 1000 mg twice a day, or viscous fiber such as Metamucil  - Attain ideal weight and regular exercise (at least 30 minutes per day of walking)    Lowering triglycerides:  - Reduce intake of simple sugar: Desserts, candy, pastries, honey, sodas, sugared cereals, yogurt, Gatorade, sports bars, canned  fruit, smoothies, fruit juice, coffee drinks  - Reduced intake of refined starches: Refined Pasta  - Reduce or abstain from alcohol  - Increase omega-3 fatty acids: [place], Trout, Mackerel, [place]ring, [place] tuna and supplements  - Attain ideal weight and regular exercise (at least 30 minutes per day of walking)      Elevating HDL (good) cholesterol:  - Increase physical activity  - Seasoned foods with garlic and onions  - Increase omega-3 fatty acids and supplements as listed above  - Incorporating appropriate amounts of monounsaturated fats such as nuts, olive oil, canola oil, avocados, olives  - Stop smoking  - Attain ideal weight and regular exercise (at least 30 minutes per day of walking)

## 2019-03-21 NOTE — LETTER
[facility]   [address]   [address]  [place] [address]  Phone: [phone]  Fax: [phone]              Rhett Barrios  1950    Encounter Date: 3/21/2019    Sudeep Paz M.D.          PROGRESS NOTE:  Chief Complaint   Patient presents with   • Hyperlipidemia       Subjective:   Rhett Barrios is a 69 y.o. male who presents today for follow-up of his history of A. fib with WPW and age 37 with coronary disease based on coronary calcification but normal stress testing he did have a false positive thallium remotely    He is been doing well since his last follow-up he was hoping to establish cardiology care locally in [place] and he had a echocardiogram which was normal a stress test which was reportedly normal blood work but overall preferred to stay in contact with [facility] cardiology he lives in [place]    He is doing well with his medications he was appropriately switched from simvastatin to atorvastatin recent LDL was below 100    Past Medical History:   Diagnosis Date   • Apnea, sleep    • Atrial fibrillation (HCC)    • Back pain    • Chickenpox    • Coronary artery calcification seen on CAT scan 8/22/2017    Agatston score of 336 in left main, left anterior descending and circumflex predominately left anterior descending with none in the right coronary artery   • Cough    • Fatigue    • GERD (gastroesophageal reflux disease)    • [place] measles    • Gilbert's syndrome    • Hyperlipidemia 2010   • Influenza    • Kidney stone    • Mumps    • Nasal drainage    • PAF (paroxysmal atrial fibrillation) (HCC)     remote, age 37 without recurrence, WPW found at that time, never ablated   • Peripheral arterial disease (HCC) 5/12/2017    Borderline ABIs of 0.8 on the right and 0.9 on the left, 0.9 bilaterally after exercise   • Pneumonia    • Snoring    • Sputum production    • Unspecified sleep apnea 09/11/2012    evaluated by Dr. Gutierrez,  "PMA   • Taylor-Parkinson-White (WPW) syndrome     Presented with atrial fibrillation and found to have long refractory period Bright bundle at age 37 with negative coronary arteriogram then. 1st evaluated by Dr. Caal in  and Dr. Doan subsequently. Referred for evaluation to Dr. Terrell at [facility], 2014,  but then decided not to go and has had no recurrent atrial fibrillation since age 37 nor any other atrial tachyarrhythmias     Past Surgical History:   Procedure Laterality Date   • ARTHROSCOPY, KNEE     • KNEE ARTHROSCOPY Right    • KNEE ARTHROTOMY Left    • VASECTOMY       Family History   Problem Relation Age of Onset   • Cancer Mother         Lymphoma   • Heart Disease Father 80        CAD and PPM,  at 94     Social History     Social History   • Marital status:      Spouse name: N/A   • Number of children: N/A   • Years of education: N/A     Occupational History   • Not on file.     Social History Main Topics   • Smoking status: Never Smoker   • Smokeless tobacco: Never Used   • Alcohol use Yes      Comment: Very rarely   • Drug use: No   • Sexual activity: Not on file     Other Topics Concern   • Not on file     Social History Narrative   • No narrative on file     Allergies   Allergen Reactions   • Digoxin Unspecified     \"Patient has WPW and was informed to not take this medicine.\"     Outpatient Encounter Prescriptions as of 3/21/2019   Medication Sig Dispense Refill   • omeprazole (PRILOSEC) 40 MG delayed-release capsule      • ranitidine (ZANTAC) 300 MG tablet      • cyanocobalamin (VITAMIN B-12) 100 MCG Tab Take 100 mcg by mouth every day.     • Flaxseed-Priscila Prim-Borage (FLAX OIL XTRA) Cap Take  by mouth.     • atorvastatin (LIPITOR) 40 MG Tab Take 1 Tab by mouth every day. 90 Tab 3   • Ascorbic Acid (ANEUDY-C PO) Take  by mouth.     • aspirin EC (ECOTRIN) 81 MG TBEC Take 81 mg by mouth every day.     • Coenzyme Q10 (CO Q 10) 100 MG CAPS Take  by mouth 2 Times a Day.     • Calcium " "Citrate 200 MG TABS Take 400 mg by mouth.     • vitamin D (CHOLECALCIFEROL) 1000 UNIT TABS Take 1,000 Units by mouth every day.     • sildenafil citrate (VIAGRA) 50 MG tablet Take 50 mg by mouth as needed. As directed      • [DISCONTINUED] VITAMIN E PO Take  by mouth.     • [DISCONTINUED] atorvastatin (LIPITOR) 40 MG Tab Take 1 Tab by mouth every day. 90 Tab 3   • [DISCONTINUED] omeprazole (PRILOSEC) 20 MG delayed-release capsule Take 20 mg by mouth every day.     • [DISCONTINUED] folic acid (FOLVITE) 400 MCG tablet Take 400 mcg by mouth every day.       No facility-administered encounter medications on file as of 3/21/2019.      Review of Systems   Constitutional: Negative for chills and fever.   HENT: Negative for sore throat.    Eyes: Negative for blurred vision.   Respiratory: Negative for cough and shortness of breath.    Cardiovascular: Negative for chest pain, palpitations, claudication, leg swelling and PND.   Gastrointestinal: Negative for abdominal pain and nausea.   Musculoskeletal: Negative for falls and joint pain.   Skin: Negative for rash.   Neurological: Negative for dizziness, focal weakness and weakness.   Endo/Heme/Allergies: Does not bruise/bleed easily.        Objective:   /86 (BP Location: Right arm, Patient Position: Sitting)   Pulse 66   Ht 1.905 m (6' 3\")   Wt 116.6 kg (257 lb)   SpO2 95%   BMI 32.12 kg/m²      Physical Exam   Constitutional: No distress.   HENT:   Mouth/Throat: Oropharynx is clear and moist. No oropharyngeal exudate.   Eyes: No scleral icterus.   Neck: No JVD present.   Cardiovascular: Normal rate and normal heart sounds.  Exam reveals no gallop and no friction rub.    No murmur heard.  Pulmonary/Chest: No respiratory distress. He has no wheezes. He has no rales.   Abdominal: Soft. Bowel sounds are normal.   Musculoskeletal: He exhibits no edema.   Neurological: He is alert.   Skin: No rash noted. He is not diaphoretic.   Psychiatric: He has a normal mood and " affect.     We reviewed his extensive records going all the way back to his initial presentation with A. fib with WPW    We reviewed the images of his coronary CAT scan with moderate coronary artery calcifications    We reviewed his recent labs which are essentially normal with good lipid control    Reviewed the report of his echocardiogram which I had read from [facility] last year which was normal and his stress ECG was reported to have some ST changes apparently nuclear imaging was normal    Assessment:     1. Taylor-Parkinson-White (WPW) syndrome     2. PAF (paroxysmal atrial fibrillation) (HCC)     3. Coronary artery calcification seen on CAT scan     4. Hypercholesterolemia  atorvastatin (LIPITOR) 40 MG Tab   5. Dyslipidemia         Medical Decision Making:  Today's Assessment / Status / Plan:     It was my pleasure to meet with Mr. Brarios.    He is accompanied by his wife    He is done well for his history of A. fib with WPW and encouraged him to inform the emergency room staff should he ever present with recurrent A. fib that he also has WPW    He is on appropriate statin.    If he were to have symptoms of angina or NSTEMI certainly would recommend coronary angiogram rather than repeat stress testing    I will see Mr. Barrios back in 1 year time and encouraged him to follow up with us over the phone or e-mail using my MyChart as issues arise.    It is my pleasure to participate in the care of Mr. Barrios.  Please do not hesitate to contact me with questions or concerns.    Sudeep Paz MD PhD [facility]  Cardiologist [facility]    Please note that this dictation was created using voice recognition software. I have worked with consultants from the vendor as well as technical experts from [facility] to optimize the interface. I have made every reasonable attempt to correct obvious errors, but I expect that there are errors of grammar and possibly content I did not  discover before finalizing the note.         Gene Galvan D.O.  [address] [place] [address]  VIA Facsimile: [phone]

## 2019-03-21 NOTE — TELEPHONE ENCOUNTER
Wife and  forgot to have you go over the blood work with them.  They are especially concerned about the CO2 (one point lower than normal).  They are wanting your opinion even though I tried to reassure them.

## 2019-04-01 ENCOUNTER — TELEPHONE (OUTPATIENT)
Dept: CARDIOLOGY | Facility: MEDICAL CENTER | Age: 69
End: 2019-04-01

## 2019-04-01 DIAGNOSIS — E78.00 HYPERCHOLESTEROLEMIA: ICD-10-CM

## 2019-04-01 RX ORDER — ATORVASTATIN CALCIUM 40 MG/1
40 TABLET, FILM COATED ORAL DAILY
Qty: 90 TAB | Refills: 3 | Status: SHIPPED | OUTPATIENT
Start: 2019-04-01 | End: 2020-03-12

## 2019-04-01 NOTE — TELEPHONE ENCOUNTER
----- Message from Jose J Smith R.N. sent at 4/1/2019  4:07 PM PDT -----  Regarding: FW: refill issue   Contact: [phone]      ----- Message -----  From: Brittany Plascencia, Med Ass't  Sent: 4/1/2019   3:54 PM  To: Martha Burks R.N., #  Subject: RE: refill issue                                 Martha PERALES re-prepped and sent it to your basket for approval    ----- Message -----  From: Farzana Rocha, Med Ass't  Sent: 4/1/2019   2:29 PM  To: Brittany Plascencia, Med Ass't, #  Subject: refill issue                                     CW     Pt's wife Jil calling for Atorvastatin Rx, she wants to know why the pharmacy doesn't have it, the encounter shows a discontinued/reorder msg.     Ph#: [phone] or [phone] (Rhett)

## 2020-02-06 ENCOUNTER — TELEPHONE (OUTPATIENT)
Dept: CARDIOLOGY | Facility: MEDICAL CENTER | Age: 70
End: 2020-02-06

## 2020-02-06 DIAGNOSIS — E78.00 HYPERCHOLESTEROLEMIA: ICD-10-CM

## 2020-02-06 NOTE — TELEPHONE ENCOUNTER
CW    Patient was wanting to know if they need labs done especially for cholesterol before their up coming appt. You can sent the labs work to their fax [phone].    Thank you,  Swetha BRANDON

## 2020-03-03 ENCOUNTER — TELEPHONE (OUTPATIENT)
Dept: CARDIOLOGY | Facility: MEDICAL CENTER | Age: 70
End: 2020-03-03

## 2020-03-04 DIAGNOSIS — E78.00 HYPERCHOLESTEROLEMIA: ICD-10-CM

## 2020-03-05 ENCOUNTER — TELEPHONE (OUTPATIENT)
Dept: CARDIOLOGY | Facility: MEDICAL CENTER | Age: 70
End: 2020-03-05

## 2020-03-12 ENCOUNTER — TELEPHONE (OUTPATIENT)
Dept: CARDIOLOGY | Facility: MEDICAL CENTER | Age: 70
End: 2020-03-12

## 2020-03-12 ENCOUNTER — OFFICE VISIT (OUTPATIENT)
Dept: CARDIOLOGY | Facility: MEDICAL CENTER | Age: 70
End: 2020-03-12
Payer: MEDICARE

## 2020-03-12 VITALS
DIASTOLIC BLOOD PRESSURE: 90 MMHG | WEIGHT: 247 LBS | SYSTOLIC BLOOD PRESSURE: 148 MMHG | HEIGHT: 75 IN | HEART RATE: 72 BPM | OXYGEN SATURATION: 94 % | BODY MASS INDEX: 30.71 KG/M2

## 2020-03-12 DIAGNOSIS — I73.9 PERIPHERAL ARTERIAL DISEASE (HCC): ICD-10-CM

## 2020-03-12 DIAGNOSIS — G47.31 PRIMARY CENTRAL SLEEP APNEA: ICD-10-CM

## 2020-03-12 DIAGNOSIS — I45.6 WOLFF-PARKINSON-WHITE (WPW) SYNDROME: ICD-10-CM

## 2020-03-12 DIAGNOSIS — E78.00 HYPERCHOLESTEROLEMIA: ICD-10-CM

## 2020-03-12 DIAGNOSIS — I25.10 CORONARY ARTERY CALCIFICATION SEEN ON CAT SCAN: ICD-10-CM

## 2020-03-12 DIAGNOSIS — E78.5 DYSLIPIDEMIA: Chronic | ICD-10-CM

## 2020-03-12 DIAGNOSIS — E88.09 HYPOALBUMINEMIA: ICD-10-CM

## 2020-03-12 DIAGNOSIS — I48.0 PAF (PAROXYSMAL ATRIAL FIBRILLATION) (HCC): ICD-10-CM

## 2020-03-12 PROCEDURE — 99214 OFFICE O/P EST MOD 30 MIN: CPT | Performed by: INTERNAL MEDICINE

## 2020-03-12 RX ORDER — FAMOTIDINE 40 MG/1
TABLET, FILM COATED ORAL
COMMUNITY
Start: 2020-03-01 | End: 2021-10-21

## 2020-03-12 RX ORDER — ATORVASTATIN CALCIUM 80 MG/1
80 TABLET, FILM COATED ORAL DAILY
Qty: 90 TAB | Refills: 3 | Status: SHIPPED | OUTPATIENT
Start: 2020-03-12 | End: 2021-04-16

## 2020-03-12 RX ORDER — AMLODIPINE BESYLATE 2.5 MG/1
2.5 TABLET ORAL DAILY
Qty: 90 TAB | Refills: 3 | Status: SHIPPED | OUTPATIENT
Start: 2020-03-12 | End: 2021-04-16

## 2020-03-12 NOTE — TELEPHONE ENCOUNTER
JM      Patient was put on blood pressure medication today. His wife Jil is worried about his hydration level going down. She can be reached at [phone].

## 2020-03-12 NOTE — PROGRESS NOTES
Cardiology Follow-up Consultation Note    Date of note:    3/12/2020    Primary Care Provider: Gene Galvan D.O.  Referring Provider: Ab Paz M.D.     Patient Name: Rhett Barrios   YOB: 1950  MRN:              [id number]    Chief Complaint: atrial fibrillation    History of Present Illness: Rhett Barrios is a 70 y.o. male whose current medical problems include hypertension, WPW, paroxysmal atrial fibrillation, CAC score of 336 in 2017, dyslipidemia, obesity, and PVD who is here for follow-up.    Last seen by Dr. Paz on 3/21/2019. His wife called and changed his next appointment to be with me.     Interim Events:  Blood pressure at home in the 130s.     Patient denies chest pain, palpitations, dyspnea on exertion, pre-syncope, syncope, lower extremity swelling, orthopnea, PND or recent weight gain.     In terms of a fib, no palpitations, not on anticoagulation.     In terms of his CAC, no angina.     In terms of his PVD, no claudication.       ROS  Constitution: Negative for chills, fever and night sweats.   HENT: Negative for nosebleeds.    Eyes: Negative for vision loss in left eye and vision loss in right eye.   Respiratory: Negative for hemoptysis.    Gastrointestinal: Negative for hematemesis, hematochezia and melena.   Genitourinary: Negative for hematuria.   Neurological: Negative for focal weakness, numbness and paresthesias.       All other systems reviewed and discussed using a comprehensive questionnaire and are negative.       Past Medical History:   Diagnosis Date   • Back pain    • Chickenpox    • Coronary artery calcification seen on CAT scan 08/22/2017    Agatston score of 336 in left main, left anterior descending and circumflex predominately left anterior descending with none in the right coronary artery   • Cough    • Fatigue    • GERD (gastroesophageal reflux disease)    • Indonesian measles    • Gilbert's syndrome    • Hyperlipidemia 2010   • Influenza    • Kidney  stone    • Mumps    • Nasal drainage    • PAF (paroxysmal atrial fibrillation) (HCC)     remote, age 37 without recurrence, WPW found at that time, never ablated. First diagnosed at [facility], did stress testing which showed highest rate was not significant and thus they weaned him off medications.    • Peripheral arterial disease (HCC) 5/12/2017    Borderline ABIs of 0.8 on the right and 0.9 on the left, 0.9 bilaterally after exercise   • Pneumonia    • Sputum production    • Unspecified sleep apnea 09/11/2012    evaluated by Dr. Gutierrez, [facility]   • Taylor-Parkinson-White (WPW) syndrome     Presented with atrial fibrillation and found to have long refractory period Bright bundle at age 37 with negative coronary arteriogram then. 1st evaluated by Dr. Caal in 2010 and Dr. Doan subsequently. Referred for evaluation to Dr. Terrell at [facility], 12/2/2014,  but then decided not to go and has had no recurrent atrial fibrillation since age 37 nor any other atrial tachyarrhythmias         Past Surgical History:   Procedure Laterality Date   • ARTHROSCOPY, KNEE     • KNEE ARTHROSCOPY Right    • KNEE ARTHROTOMY Left    • VASECTOMY           Current Outpatient Medications   Medication Sig Dispense Refill   • famotidine (PEPCID) 40 MG Tab      • atorvastatin (LIPITOR) 40 MG Tab Take 1 Tab by mouth every day. 90 Tab 3   • omeprazole (PRILOSEC) 40 MG delayed-release capsule      • cyanocobalamin (VITAMIN B-12) 100 MCG Tab Take 100 mcg by mouth every day.     • Flaxseed-Priscila Prim-Borage (FLAX OIL XTRA) Cap Take  by mouth.     • Ascorbic Acid (ANEUDY-C PO) Take  by mouth.     • aspirin EC (ECOTRIN) 81 MG TBEC Take 81 mg by mouth every day.     • Coenzyme Q10 (CO Q 10) 100 MG CAPS Take  by mouth 2 Times a Day.     • Calcium Citrate 200 MG TABS Take 400 mg by mouth.     • vitamin D (CHOLECALCIFEROL) 1000 UNIT TABS Take 1,000 Units by mouth every day.     • sildenafil citrate (VIAGRA) 50 MG tablet Take 50 mg by mouth as needed. As directed    "     No current facility-administered medications for this visit.          Allergies   Allergen Reactions   • Digoxin Unspecified     \"Patient has WPW and was informed to not take this medicine.\"         Family History   Problem Relation Age of Onset   • Cancer Mother         Lymphoma   • Heart Disease Father 80        CAD and PPM,  at 94         Social History     Socioeconomic History   • Marital status:      Spouse name: Not on file   • Number of children: Not on file   • Years of education: Not on file   • Highest education level: Not on file   Occupational History   • Not on file   Social Needs   • Financial resource strain: Not on file   • Food insecurity     Worry: Not on file     Inability: Not on file   • Transportation needs     Medical: Not on file     Non-medical: Not on file   Tobacco Use   • Smoking status: Never Smoker   • Smokeless tobacco: Never Used   Substance and Sexual Activity   • Alcohol use: Yes     Comment: Very rarely   • Drug use: No   • Sexual activity: Not on file   Lifestyle   • Physical activity     Days per week: Not on file     Minutes per session: Not on file   • Stress: Not on file   Relationships   • Social connections     Talks on phone: Not on file     Gets together: Not on file     Attends Scientologist service: Not on file     Active member of club or organization: Not on file     Attends meetings of clubs or organizations: Not on file     Relationship status: Not on file   • Intimate partner violence     Fear of current or ex partner: Not on file     Emotionally abused: Not on file     Physically abused: Not on file     Forced sexual activity: Not on file   Other Topics Concern   • Not on file   Social History Narrative             Physical Exam:  Ambulatory Vitals  /90 (BP Location: Right arm, Patient Position: Sitting)   Pulse 72   Ht 1.905 m (6' 3\")   Wt 112 kg (247 lb)   SpO2 94%    Oxygen Therapy:  Pulse Oximetry: 94 %  BP Readings from Last 4 " Encounters:   20 148/90   19 148/86   18 120/80   17 142/80       Weight/BMI: Body mass index is 30.87 kg/m².  Wt Readings from Last 4 Encounters:   20 112 kg (247 lb)   19 116.6 kg (257 lb)   18 113.4 kg (250 lb)   18 113.4 kg (250 lb)       General: Well appearing and in no apparent distress  Eyes: nl conjunctiva  ENT: OP clear, normal external appearance of nose and ears  Neck: JVP 4 cm H2O, no carotid bruits  Lungs: normal respiratory effort, CTAB  Heart: RRR, no murmurs, no rubs or gallops, no edema bilateral lower extremities. No LV/RV heave on cardiac palpatation. 2+ bilateral radial pulses.  2+ bilateral dp pulses.   Abdomen: soft, non tender, non distended, no masses, normal bowel sounds.  No HSM.  Extremities/MSK: no clubbing, no cyanosis  Neurological: No focal sensory deficits  Psychiatric: Appropriate affect, A/O x 3, intact judgement and insight  Skin: Warm extremities    Lab Data Review:  No results found for: CHOLSTRLTOT, LDL, HDL, TRIGLYCERIDE    No results found for: SODIUM, POTASSIUM, CHLORIDE, CO2, GLUCOSE, BUN, CREATININE, BUNCREATRAT, GLOMRATE  No results found for: ALKPHOSPHAT, ASTSGOT, ALTSGPT, TBILIRUBIN   No results found for: WBC  No components found for: HBGA1C  No components found for: TROPONIN  No components found for: BNP    OSH labs 3/4/2020:  Sodium 143  K+ 4.5  Bun 13  Creatinine 1.1  LFTs WNL except albumin 2.6 and TB of 1.1.    HDL 58  LDL 91  tg 78    Cardiac Imaging and Procedures Review:    EKG dated  : My personal interpretation is NSR, incomplete RBBB, non-specific st changes.     Echo dated 3/2018  Normal.     Exercise stress testing (2018): normal. Treadmill test. Under media tab. 8 minutes on griselda protocol.       Radiology test Review:    Carotid Artery Ultrasound (3/2018):   Mild bilateral plaque.   PFTs 2018: normal.      CAC score of 336 in 2017      Medical Decision Makin. Coronary artery  calcification seen on CAT scan  Discussed use of aspirin for primary prevention and this is an indeterminate range (CAC in the 300s), but given his h/o a fib, I did recommend remaining on baby aspirin and he agreed  -continue aspirin 81mg PO daily  -increase lipitor, goal LDL <70    2. Dyslipidemia  Lipitor. Follow liver function.       3. PAF (paroxysmal atrial fibrillation) (HCC)  Self limited >30 years ago. Asymptomatic off medications  -CTM, ED precautions discussed      4. Peripheral arterial disease (HCC)  ABIs in the 80s noted on previous screening LE ultrasound exams.  -aspirin/statin.    5. Taylor-Parkinson-White (WPW) syndrome  Without ablation. Has been asymptomatic for decades and per report had testing that showed his max heart rate was not dangerously high in his SVT  -CTM, ED precautions.    6. Primary central sleep apnea  Mild and not on therapy  -CTM    7. Hypoalbuminemia  Dropping albumin, 2.9 last year, 2.6 this year. Interestingly he has no renal or GI symptoms and no edema.  -advised f/u with PCP  -recheck in 6 months along with pre-albumin. If remains low, we will need to check for proteinuria and protein-losing gastroenteropathy.   -We did discuss health dietary choices.       Return in about 1 year (around 3/12/2021).      Rashaun Jenkins MD, [facility]  [facility], [facility]  [address] [place] [id number]  Phone: [phone]  Fax: [phone]

## 2020-03-17 NOTE — TELEPHONE ENCOUNTER
Rhett Barrios 9 minutes ago (9:54 AM)         Thank you for the follow up. My wife was concerned re the new blood pressure medication since she thought it was a diuretic and recently (not sure if still the case) even though I drink a lot of water a  said I was a bit dehydrated and she wondered if the blood pressure med. would make it worse?     Only slightly dehydrated.     Lu         Education regarding new med (Amlodipine) provided to pt through [facility]

## 2020-07-23 ENCOUNTER — TELEPHONE (OUTPATIENT)
Dept: CARDIOLOGY | Facility: MEDICAL CENTER | Age: 70
End: 2020-07-23

## 2020-07-23 RX ORDER — POTASSIUM CHLORIDE 750 MG/1
10 TABLET, EXTENDED RELEASE ORAL DAILY
Qty: 30 TAB | Refills: 11 | Status: SHIPPED | OUTPATIENT
Start: 2020-07-23 | End: 2021-03-29 | Stop reason: SDUPTHER

## 2020-07-23 NOTE — TELEPHONE ENCOUNTER
Refer to Wink message. Per Dr. Jenkins, discontinue amlodipine, continue taking the Chlorthalidone prescribed by PCP, and in addition, start taking potassium chloride 10meq daily.    Recommendations sent to patient via [facility]ink message.

## 2020-10-02 DIAGNOSIS — E78.00 HYPERCHOLESTEROLEMIA: ICD-10-CM

## 2020-10-02 DIAGNOSIS — E78.5 DYSLIPIDEMIA: Chronic | ICD-10-CM

## 2020-10-02 DIAGNOSIS — I48.0 PAF (PAROXYSMAL ATRIAL FIBRILLATION) (HCC): ICD-10-CM

## 2020-10-05 DIAGNOSIS — E78.00 HYPERCHOLESTEROLEMIA: ICD-10-CM

## 2020-10-05 DIAGNOSIS — I48.0 PAF (PAROXYSMAL ATRIAL FIBRILLATION) (HCC): ICD-10-CM

## 2020-10-05 DIAGNOSIS — E78.5 DYSLIPIDEMIA: Chronic | ICD-10-CM

## 2020-10-06 ENCOUNTER — TELEPHONE (OUTPATIENT)
Dept: CARDIOLOGY | Facility: MEDICAL CENTER | Age: 70
End: 2020-10-06

## 2020-10-06 DIAGNOSIS — E88.09 HYPOALBUMINEMIA: ICD-10-CM

## 2020-10-06 NOTE — TELEPHONE ENCOUNTER
----- Message from Cami Gupta R.N. sent at 10/2/2020  2:06 PM PDT -----  To : No FV scheduled. Due to f/u in March 2021.

## 2020-10-06 NOTE — TELEPHONE ENCOUNTER
Labs reviewed and showed continued low albumin (2.7) with no apparent cause.    Attempted to call patient to discuss.  I believe we need to look for urinary and colonic causes of low albumin (protein losing enteropathy vs nephrotic syndrome).    LDL 78, almost at goal.   tg 73  HDL 67      No changes to cholesterol meds at this time.     He should I have a stool test, BMP, and urine tests which I have pended. Will try later this week to call him to discuss. If he calls back, please let him know the above if he is ok with getting the tests I can place the orders. If not, I will keep trying to connect with him. Thanks!

## 2020-11-20 NOTE — TELEPHONE ENCOUNTER
Discussed low albumin and he agreed to further testing.     Please send him the lab slips to perform these tests to work-up his hypoalbuminemia. Thanks!

## 2020-12-09 DIAGNOSIS — R53.1 WEAKNESS: ICD-10-CM

## 2020-12-09 DIAGNOSIS — E88.09 HYPOALBUMINEMIA: ICD-10-CM

## 2020-12-15 NOTE — TELEPHONE ENCOUNTER
"  Called the patient and he stated he had received the lab slips we 'faxed to him and yes I have them\".  He then stated \" I will go get those done\".  Answered all questions and concerns.    "

## 2021-03-19 DIAGNOSIS — E88.09 HYPOALBUMINEMIA: ICD-10-CM

## 2021-03-26 ENCOUNTER — TELEPHONE (OUTPATIENT)
Dept: CARDIOLOGY | Facility: MEDICAL CENTER | Age: 71
End: 2021-03-26

## 2021-03-26 NOTE — TELEPHONE ENCOUNTER
[facility] labs reviewed 3/12/2021:  Alpha-1-antitrypsin serum 183 (ref 101-187)  Sodium 142  Potassium 3.2  Bun 20  Creatinine 1.2  eGFR 58  Co2 25  Cl 105  Calcium 8.7  Urine creatinine 256.7mg/dL  Urine protein 11  Ratio - 0.04  UA dipstick negative for protein.     Can we call and let him know labs are all stable except for his potassium? Can we clarify his medications, and have him increase his potassium by 10mEq whatever dose he is currently on.     Thank him for getting the labs. There is no evidence of what is causing his protein and albumin levels to be low, as his urine studies are normal. We will await his stool studies.     Can we schedule a follow-up appointment with him and me, virtual is ok if necessary. Thanks!

## 2021-03-26 NOTE — TELEPHONE ENCOUNTER
----- Message from Mitra Julien R.N. sent at 3/22/2021  7:58 AM PDT -----  To FAIZA: No FV scheduled.

## 2021-03-29 RX ORDER — POTASSIUM CHLORIDE 20 MEQ/1
20 TABLET, EXTENDED RELEASE ORAL DAILY
Qty: 90 TABLET | Refills: 3 | Status: SHIPPED | OUTPATIENT
Start: 2021-03-29 | End: 2021-10-21

## 2021-03-29 NOTE — TELEPHONE ENCOUNTER
Received callback from the patient. Relayed all results and recommendations from FAIZA BLACK.    Patient currently taking 10mEq, advised to increase to 20mEq daily and I will send in a new RX.   Scheduled for a Virtual Visit for follow up.   RX reordered and sent to preferred pharmacy.

## 2021-03-29 NOTE — TELEPHONE ENCOUNTER
LM on patient home phone and work phone , patient identifier on VM on both and advised to callback with potassium dosage and to schedule a FV with FAIZA BLACK.

## 2021-03-31 DIAGNOSIS — E88.09 HYPOALBUMINEMIA: ICD-10-CM

## 2021-04-07 ENCOUNTER — TELEPHONE (OUTPATIENT)
Dept: CARDIOLOGY | Facility: MEDICAL CENTER | Age: 71
End: 2021-04-07

## 2021-04-07 NOTE — TELEPHONE ENCOUNTER
Called patient in regards to VVEP appt scheduled with FAIZA on 04/15/2021. LVM for patient regarding instructions for virtual appointment. Confirmed appointment date, time, & location. Advised to please wear a mask and to call main office if their were any questions or concerns.

## 2021-04-15 ENCOUNTER — APPOINTMENT (OUTPATIENT)
Dept: CARDIOLOGY | Facility: PHYSICIAN GROUP | Age: 71
End: 2021-04-15
Payer: MEDICARE

## 2021-04-16 ENCOUNTER — TELEMEDICINE (OUTPATIENT)
Dept: CARDIOLOGY | Facility: MEDICAL CENTER | Age: 71
End: 2021-04-16
Payer: MEDICARE

## 2021-04-16 ENCOUNTER — TELEPHONE (OUTPATIENT)
Dept: CARDIOLOGY | Facility: MEDICAL CENTER | Age: 71
End: 2021-04-16

## 2021-04-16 VITALS
BODY MASS INDEX: 30.2 KG/M2 | HEIGHT: 76 IN | DIASTOLIC BLOOD PRESSURE: 83 MMHG | HEART RATE: 63 BPM | SYSTOLIC BLOOD PRESSURE: 136 MMHG | TEMPERATURE: 96.9 F | WEIGHT: 248 LBS

## 2021-04-16 DIAGNOSIS — I45.6 WOLFF-PARKINSON-WHITE (WPW) SYNDROME: ICD-10-CM

## 2021-04-16 DIAGNOSIS — I25.10 CORONARY ARTERY CALCIFICATION SEEN ON CAT SCAN: ICD-10-CM

## 2021-04-16 DIAGNOSIS — E78.5 DYSLIPIDEMIA: Chronic | ICD-10-CM

## 2021-04-16 DIAGNOSIS — E78.00 HYPERCHOLESTEROLEMIA: ICD-10-CM

## 2021-04-16 DIAGNOSIS — R73.01 IMPAIRED FASTING GLUCOSE: ICD-10-CM

## 2021-04-16 DIAGNOSIS — I48.0 PAF (PAROXYSMAL ATRIAL FIBRILLATION) (HCC): ICD-10-CM

## 2021-04-16 DIAGNOSIS — I73.9 PERIPHERAL ARTERIAL DISEASE (HCC): ICD-10-CM

## 2021-04-16 PROCEDURE — 99214 OFFICE O/P EST MOD 30 MIN: CPT | Mod: 95,CR | Performed by: INTERNAL MEDICINE

## 2021-04-16 RX ORDER — ATORVASTATIN CALCIUM 80 MG/1
80 TABLET, FILM COATED ORAL DAILY
Qty: 90 TABLET | Refills: 3 | Status: SHIPPED | OUTPATIENT
Start: 2021-04-16 | End: 2021-10-21

## 2021-04-16 RX ORDER — CHLORTHALIDONE 25 MG/1
25 TABLET ORAL DAILY
COMMUNITY
Start: 2021-04-11 | End: 2023-01-19

## 2021-04-16 NOTE — TELEPHONE ENCOUNTER
Lab slip ordered today per  at VVE appointment, mailed to patient's home address (verifed at check-in) and AVS has been mailed.

## 2021-04-16 NOTE — PROGRESS NOTES
Cardiology Virtual Follow-up Consultation Note    Date of note:   4/16/2021  Primary Care Provider: Gene Galvan D.O.  Referring Provider: Ab Paz M.D.     Patient Name: Rhett Barrios   YOB: 1950  MRN:              [id number]    Chief Complaint: atrial fibrillation    History of Present Illness: Rhett Barrios is a 71 y.o. male whose current medical problems include hypertension, WPW, paroxysmal atrial fibrillation, CAC score of 336 in 2017, dyslipidemia, obesity, and PVD who is here for follow-up.    At our visit,  3/12/2020:  Blood pressure at home in the 130s.     Interim Events:  Hypertension well controlled.   In terms of a fib, no palpitations, not on anticoagulation.     In terms of his CAC, no angina.     In terms of his PVD, does have claudication at the end of when he's walking up hills.     Patient denies chest pain, palpitations, dyspnea on exertion, pre-syncope, syncope, lower extremity swelling, orthopnea, PND or recent weight gain.       ROS  Constitution: Negative for chills, fever and night sweats.   HENT: Negative for nosebleeds.    Eyes: Negative for vision loss in left eye and vision loss in right eye.   Respiratory: Negative for hemoptysis.    Gastrointestinal: Negative for hematemesis, hematochezia and melena.   Genitourinary: Negative for hematuria.   Neurological: Negative for focal weakness, numbness and paresthesias.       Past Medical History:   Diagnosis Date   • Back pain    • Chickenpox    • Coronary artery calcification seen on CAT scan 08/22/2017    Agatston score of 336 in left main, left anterior descending and circumflex predominately left anterior descending with none in the right coronary artery   • Cough    • Fatigue    • GERD (gastroesophageal reflux disease)    • Croat[place] measles    • Gilbert's syndrome    • Hyperlipidemia 2010   • Influenza    • Kidney stone    • Mumps    • Nasal drainage    • PAF (paroxysmal atrial fibrillation) (HCC)     remote,  age 37 without recurrence, WPW found at that time, never ablated. First diagnosed at [facility], did stress testing which showed highest rate was not significant and thus they weaned him off medications.    • Peripheral arterial disease (HCC) 5/12/2017    Borderline ABIs of 0.8 on the right and 0.9 on the left, 0.9 bilaterally after exercise   • Pneumonia    • Sputum production    • Unspecified sleep apnea 09/11/2012    evaluated by Dr. Gutierrez, [facility]   • Taylor-Parkinson-White (WPW) syndrome     Presented with atrial fibrillation and found to have long refractory period Bright bundle at age 37 with negative coronary arteriogram then. 1st evaluated by Dr. Caal in 2010 and Dr. Doan subsequently. Referred for evaluation to Dr. Terrell at [facility], 12/2/2014,  but then decided not to go and has had no recurrent atrial fibrillation since age 37 nor any other atrial tachyarrhythmias         Past Surgical History:   Procedure Laterality Date   • ARTHROSCOPY, KNEE     • KNEE ARTHROSCOPY Right    • KNEE ARTHROTOMY Left    • VASECTOMY           Current Outpatient Medications   Medication Sig Dispense Refill   • chlorthalidone (HYGROTON) 25 MG Tab Take 25 mg by mouth every day.     • potassium chloride SA (KDUR) 20 MEQ Tab CR Take 1 tablet by mouth every day. 90 tablet 3   • famotidine (PEPCID) 40 MG Tab      • atorvastatin (LIPITOR) 80 MG tablet Take 1 Tab by mouth every day. 90 Tab 3   • omeprazole (PRILOSEC) 40 MG delayed-release capsule      • Flaxseed-Priscila Prim-Borage (FLAX OIL XTRA) Cap Take  by mouth.     • Ascorbic Acid (ANEUDY-C PO) Take  by mouth.     • aspirin EC (ECOTRIN) 81 MG TBEC Take 81 mg by mouth every day.     • Coenzyme Q10 (CO Q 10) 100 MG CAPS Take  by mouth 2 Times a Day.     • vitamin D (CHOLECALCIFEROL) 1000 UNIT TABS Take 1,000 Units by mouth every day.     • sildenafil citrate (VIAGRA) 50 MG tablet Take 50 mg by mouth as needed. As directed      • amLODIPine (NORVASC) 2.5 MG Tab Take 1 Tab by mouth every  "day.  Tab 3     No current facility-administered medications for this visit.         Allergies   Allergen Reactions   • Digoxin Unspecified     \"Patient has WPW and was informed to not take this medicine.\"         Family History   Problem Relation Age of Onset   • Cancer Mother         Lymphoma   • Heart Disease Father 80        CAD and PPM,  at 94         Social History     Socioeconomic History   • Marital status:      Spouse name: Not on file   • Number of children: Not on file   • Years of education: Not on file   • Highest education level: Not on file   Occupational History   • Not on file   Tobacco Use   • Smoking status: Never Smoker   • Smokeless tobacco: Never Used   Substance and Sexual Activity   • Alcohol use: Yes     Comment: Very rarely   • Drug use: No   • Sexual activity: Not on file   Other Topics Concern   • Not on file   Social History Narrative         Social Determinants of Health     Financial Resource Strain:    • Difficulty of Paying Living Expenses:    Food Insecurity:    • Worried About Running Out of Food in the Last Year:    • Ran Out of Food in the Last Year:    Transportation Needs:    • Lack of Transportation (Medical):    • Lack of Transportation (Non-Medical):    Physical Activity:    • Days of Exercise per Week:    • Minutes of Exercise per Session:    Stress:    • Feeling of Stress :    Social Connections:    • Frequency of Communication with Friends and Family:    • Frequency of Social Gatherings with Friends and Family:    • Attends [employer] Services:    • Active Member of Clubs or Organizations:    • Attends Club or Organization Meetings:    • Marital Status:    Intimate Partner Violence:    • Fear of Current or Ex-Partner:    • Emotionally Abused:    • Physically Abused:    • Sexually Abused:          Physical Exam:  Ambulatory Vitals  /83 (BP Location: Left arm, Patient Position: Sitting, BP Cuff Size: Adult)   Pulse 63   Temp 36.1 °C (96.9 °F) " "(Temporal)   Ht 1.93 m (6' 4\")   Wt 112 kg (248 lb)    Oxygen Therapy:     BP Readings from Last 4 Encounters:   04/16/21 136/83   03/12/20 148/90   03/21/19 148/86   05/30/18 120/80       Weight/BMI: Body mass index is 30.19 kg/m².  Wt Readings from Last 4 Encounters:   04/16/21 112 kg (248 lb)   03/12/20 112 kg (247 lb)   03/21/19 117 kg (257 lb)   05/30/18 113 kg (250 lb)     General: No apparent distress  Eyes: nl conjunctiva  Neck: JVP appeared normal from afar  Lungs: normal respiratory effort  Neurological: Moving upper extremities.   Psychiatric: Appropriate affect, A/O x 3, intact judgement and insight  Skin: no visible rashes      Lab Data Review:  No results found for: CHOLSTRLTOT, LDL, HDL, TRIGLYCERIDE    No results found for: SODIUM, POTASSIUM, CHLORIDE, CO2, GLUCOSE, BUN, CREATININE, BUNCREATRAT, GLOMRATE  No results found for: ALKPHOSPHAT, ASTSGOT, ALTSGPT, TBILIRUBIN   No results found for: WBC  No components found for: HBGA1C  No components found for: TROPONIN  No results found for: BNP    OSH labs 3/4/2020:  Sodium 143  K+ 4.5  Bun 13  Creatinine 1.1  LFTs WNL except albumin 2.6 and TB of 1.1.    HDL 58  LDL 91  tg 78    OSH labs 10/2020:  LDL 78, almost at goal.   tg 73  HDL 67        OSH labs reviewed 3/12/2021:  Alpha-1-antitrypsin serum 183 (ref 101-187)  Sodium 142  Potassium 3.2  Bun 20  Creatinine 1.2  eGFR 58  Co2 25  Cl 105  Calcium 8.7  Urine creatinine 256.7mg/dL  Urine protein 11  Ratio - 0.04  UA dipstick negative for protein.     Stool anti-trypsin negative.     Cardiac Imaging and Procedures Review:    EKG dated 92016 : My personal interpretation is NSR, incomplete RBBB, non-specific st changes.     Echo dated 3/2018  Normal.     Exercise stress testing (5/2018): normal. Treadmill test. Under media tab. 8 minutes on griselda protocol.       Radiology test Review:    Carotid Artery Ultrasound (3/2018):   Mild bilateral plaque.   PFTs 5/2018: normal.      CAC score of 336 " in 2017      Medical Decision Makin. Coronary artery calcification seen on CAT scan  Discussed use of aspirin for primary prevention and this is an indeterminate range (CAC in the 300s), but given his h/o a fib, I did recommend remaining on baby aspirin and he agreed  -continue aspirin 81mg PO daily  -increase lipitor, goal LDL <70    2. Dyslipidemia  Lipitor. Follow liver function.     3. PAF (paroxysmal atrial fibrillation) (HCC)  Self limited >30 years ago. Asymptomatic off medications. Decision to be off anticoagulation was made long ago.   -CTM, ED precautions discussed    4. Peripheral arterial disease (HCC)  ABIs in the 80s noted on previous screening LE ultrasound exams.  -aspirin/statin.    5. Taylor-Parkinson-White (WPW) syndrome  Without ablation. Has been asymptomatic for decades and per report had testing that showed his max heart rate was not dangerously high in his SVT  -CTM, ED precautions.    6. Primary central sleep apnea  Mild and not on therapy  -CTM    7. Hypoalbuminemia  Dropping albumin, 2.9 and 2.6 previously. Interestingly he has no renal or GI symptoms and no edema. Negative work up for urinary or GI losses.   -continue to monitor  -recheck in 6 months.     8,. Elevated blood glucose - check hgbA1c with next labs.       Return in about 1 year (around 2022).    This evaluation was conducted via Zoom using secure and encrypted videoconferencing technology. The patient was in a private location in the [facility].    The patient's identity was confirmed and verbal consent was obtained for this virtual visit.        Rashaun Jenkins MD, [facility]  [facility], [facility].  [address] [address]  Phone: [phone]  Fax: [phone]

## 2021-10-14 ENCOUNTER — TELEPHONE (OUTPATIENT)
Dept: CARDIOLOGY | Facility: MEDICAL CENTER | Age: 71
End: 2021-10-14

## 2021-10-14 NOTE — TELEPHONE ENCOUNTER
Called and spoke to the patient. He stated his Insurance contacted him regarding his labs and denied his claim due to his labs being low. I advised JM note states to continue to monitor and recheck labs in 6 months. Stated he needs to contact medical records and see if they sent information as we do not send those. Gave him medical records number, advised he can also request that they be sent to them when he speaks to them. Answered all questions and concerns, appreciative of call.

## 2021-10-14 NOTE — TELEPHONE ENCOUNTER
FAIZA        PT was contacted by insurance with concerns about his Alumin levels being low. According to his last conversation with FAIZA this wasn't something to be concerned about. PT needing clarification for the insurance company.     Best contact number: [phone]      Thank you,    Radha BALDERAS

## 2021-10-21 ENCOUNTER — TELEMEDICINE (OUTPATIENT)
Dept: CARDIOLOGY | Facility: MEDICAL CENTER | Age: 71
End: 2021-10-21
Payer: MEDICARE

## 2021-10-21 ENCOUNTER — PATIENT MESSAGE (OUTPATIENT)
Dept: CARDIOLOGY | Facility: MEDICAL CENTER | Age: 71
End: 2021-10-21

## 2021-10-21 VITALS — WEIGHT: 245 LBS | HEIGHT: 76 IN | BODY MASS INDEX: 29.83 KG/M2

## 2021-10-21 DIAGNOSIS — R00.1 SINUS BRADYCARDIA: ICD-10-CM

## 2021-10-21 DIAGNOSIS — E78.5 DYSLIPIDEMIA: Chronic | ICD-10-CM

## 2021-10-21 DIAGNOSIS — E78.00 HYPERCHOLESTEROLEMIA: ICD-10-CM

## 2021-10-21 DIAGNOSIS — I25.10 CORONARY ARTERY CALCIFICATION SEEN ON CAT SCAN: ICD-10-CM

## 2021-10-21 DIAGNOSIS — I73.9 PERIPHERAL ARTERIAL DISEASE (HCC): ICD-10-CM

## 2021-10-21 DIAGNOSIS — I45.6 WOLFF-PARKINSON-WHITE (WPW) SYNDROME: ICD-10-CM

## 2021-10-21 DIAGNOSIS — I48.0 PAF (PAROXYSMAL ATRIAL FIBRILLATION) (HCC): ICD-10-CM

## 2021-10-21 DIAGNOSIS — R73.01 IMPAIRED FASTING GLUCOSE: ICD-10-CM

## 2021-10-21 PROCEDURE — 99214 OFFICE O/P EST MOD 30 MIN: CPT | Mod: 95 | Performed by: INTERNAL MEDICINE

## 2021-10-21 RX ORDER — ATORVASTATIN CALCIUM 80 MG/1
80 TABLET, FILM COATED ORAL DAILY
Qty: 90 TABLET | Refills: 3 | Status: SHIPPED | OUTPATIENT
Start: 2021-10-21 | End: 2022-04-19

## 2021-10-21 RX ORDER — POTASSIUM CHLORIDE 20 MEQ/1
20 TABLET, EXTENDED RELEASE ORAL DAILY
Qty: 90 TABLET | Refills: 3 | Status: SHIPPED | OUTPATIENT
Start: 2021-10-21 | End: 2022-04-19

## 2021-10-21 NOTE — PROGRESS NOTES
Cardiology Virtual Follow-up Consultation Note    Date of note:   10/21/2021  Primary Care Provider: Gene Galvan D.O.  Referring Provider: Ab Paz M.D.     Patient Name: Rhett Barrios   YOB: 1950  MRN:              [id number]    Chief Complaint: atrial fibrillation    History of Present Illness: Rhett Barrios is a 71 y.o. male whose current medical problems include hypertension, WPW, paroxysmal atrial fibrillation, CAC score of 336 in 2017, dyslipidemia, obesity, and PVD who I am seeing for follow-up.     At our visit,  3/12/2020:  Blood pressure at home in the 130s.     At our visit, 4/16/2021:  Hypertension well controlled.     In terms of his PVD, does have claudication at the end of when he's walking up hills.     Interim Events:  In terms of a fib, no palpitations, not on anticoagulation.     In terms of his CAC, no angina.     Still with muscle pain.     ROS  Patient denies chest pain, dyspnea on exertion,         Past Medical History:   Diagnosis Date   • Back pain    • Chickenpox    • Coronary artery calcification seen on CAT scan 08/22/2017    Agatston score of 336 in left main, left anterior descending and circumflex predominately left anterior descending with none in the right coronary artery   • Cough    • Fatigue    • GERD (gastroesophageal reflux disease)    • [place]amb[place] measles    • Gilbert's syndrome    • Hyperlipidemia 2010   • Influenza    • Kidney stone    • Mumps    • Nasal drainage    • PAF (paroxysmal atrial fibrillation) ([facility])     remote, age 37 without recurrence, WPW found at that time, never ablated. First diagnosed at [facility], did stress testing which showed highest rate was not significant and thus they weaned him off medications.    • Peripheral arterial disease (HCC) 5/12/2017    Borderline ABIs of 0.8 on the right and 0.9 on the left, 0.9 bilaterally after exercise   • Pneumonia    • Sputum production    • Unspecified sleep apnea 09/11/2012    evaluated  "by Dr. Gutierrez, [facility]   • Taylor-Parkinson-White (WPW) syndrome     Presented with atrial fibrillation and found to have long refractory period Bright bundle at age 37 with negative coronary arteriogram then. 1st evaluated by Dr. Caal in  and Dr. Doan subsequently. Referred for evaluation to Dr. Terrell at [facility], 2014,  but then decided not to go and has had no recurrent atrial fibrillation since age 37 nor any other atrial tachyarrhythmias         Past Surgical History:   Procedure Laterality Date   • ARTHROSCOPY, KNEE     • KNEE ARTHROSCOPY Right    • KNEE ARTHROTOMY Left    • VASECTOMY           Current Outpatient Medications   Medication Sig Dispense Refill   • chlorthalidone (HYGROTON) 25 MG Tab Take 25 mg by mouth every day.     • atorvastatin (LIPITOR) 80 MG tablet Take 1 tablet by mouth every day. 90 tablet 3   • potassium chloride SA (KDUR) 20 MEQ Tab CR Take 1 tablet by mouth every day. 90 tablet 3   • famotidine (PEPCID) 40 MG Tab      • omeprazole (PRILOSEC) 40 MG delayed-release capsule      • Flaxseed-Priscila Prim-Borage (FLAX OIL XTRA) Cap Take  by mouth.     • Ascorbic Acid (ANEUDY-C PO) Take  by mouth.     • aspirin EC (ECOTRIN) 81 MG TBEC Take 81 mg by mouth every day.     • Coenzyme Q10 (CO Q 10) 100 MG CAPS Take  by mouth 2 Times a Day.     • vitamin D (CHOLECALCIFEROL) 1000 UNIT TABS Take 1,000 Units by mouth every day.     • sildenafil citrate (VIAGRA) 50 MG tablet Take 50 mg by mouth as needed. As directed        No current facility-administered medications for this visit.         Allergies   Allergen Reactions   • Digoxin Unspecified     \"Patient has WPW and was informed to not take this medicine.\"   • Norvasc [Amlodipine]      Leg swelling         Family History   Problem Relation Age of Onset   • Cancer Mother         Lymphoma   • Heart Disease Father 80        CAD and PPM,  at 94         Social History     Socioeconomic History   • Marital status:      Spouse name: Not on file "   • Number of children: Not on file   • Years of education: Not on file   • Highest education level: Not on file   Occupational History   • Not on file   Tobacco Use   • Smoking status: Never Smoker   • Smokeless tobacco: Never Used   Vaping Use   • Vaping Use: Never used   Substance and Sexual Activity   • Alcohol use: Yes     Comment: Very rarely   • Drug use: No   • Sexual activity: Not on file   Other Topics Concern   • Not on file   Social History Narrative         Social Determinants of Health     Financial Resource Strain:    • Difficulty of Paying Living Expenses:    Food Insecurity:    • Worried About Running Out of Food in the Last Year:    • Ran Out of Food in the Last Year:    Transportation Needs:    • Lack of Transportation (Medical):    • Lack of Transportation (Non-Medical):    Physical Activity:    • Days of Exercise per Week:    • Minutes of Exercise per Session:    Stress:    • Feeling of Stress :    Social Connections:    • Frequency of Communication with Friends and Family:    • Frequency of Social Gatherings with Friends and Family:    • Attends Mosque Services:    • Active Member of Clubs or Organizations:    • Attends Club or Organization Meetings:    • Marital Status:    Intimate Partner Violence:    • Fear of Current or Ex-Partner:    • Emotionally Abused:    • Physically Abused:    • Sexually Abused:          Physical Exam:  Ambulatory Vitals  There were no vitals taken for this visit.   Oxygen Therapy:     BP Readings from Last 4 Encounters:   04/16/21 136/83   03/12/20 148/90   03/21/19 148/86   05/30/18 120/80       Weight/BMI: There is no height or weight on file to calculate BMI.  Wt Readings from Last 4 Encounters:   04/16/21 112 kg (248 lb)   03/12/20 112 kg (247 lb)   03/21/19 117 kg (257 lb)   05/30/18 113 kg (250 lb)     General: No apparent distress  Eyes: nl conjunctiva  Neck: JVP appeared normal from afar  Lungs: normal respiratory effort  Neurological: Moving upper  extremities.   Psychiatric: Appropriate affect, A/O x 3, intact judgement and insight  Skin: no visible rashes        Lab Data Review:  No results found for: CHOLSTRLTOT, LDL, HDL, TRIGLYCERIDE    No results found for: SODIUM, POTASSIUM, CHLORIDE, CO2, GLUCOSE, BUN, CREATININE, BUNCREATRAT, GLOMRATE  No results found for: ALKPHOSPHAT, ASTSGOT, ALTSGPT, TBILIRUBIN   No results found for: WBC  No components found for: HBGA1C  No components found for: TROPONIN  No results found for: BNP    OSH labs 3/4/2020:  Sodium 143  K+ 4.5  Bun 13  Creatinine 1.1  LFTs WNL except albumin 2.6 and TB of 1.1.    HDL 58  LDL 91  tg 78    OSH labs 10/2020:  LDL 78, almost at goal.   tg 73  HDL 67        OSH labs reviewed 3/12/2021:  Alpha-1-antitrypsin serum 183 (ref 101-187)  Sodium 142  Potassium 3.2  Bun 20  Creatinine 1.2  eGFR 58  Co2 25  Cl 105  Calcium 8.7  Urine creatinine 256.7mg/dL  Urine protein 11  Ratio - 0.04  UA dipstick negative for protein.     Stool anti-trypsin negative.     Cardiac Imaging and Procedures Review:    EKG dated  : My personal interpretation is NSR, incomplete RBBB, non-specific st changes.     Echo dated 3/2018  Normal.     Exercise stress testing (2018): normal. Treadmill test. Under media tab. 8 minutes on griselda protocol.       Radiology test Review:    Carotid Artery Ultrasound (3/2018):   Mild bilateral plaque.   PFTs 2018: normal.     CAC score of 336 in 2017      Medical Decision Makin. Coronary artery calcification seen on CAT scan  Discussed use of aspirin for primary prevention and this is an indeterminate range (CAC in the 300s), but given his h/o a fib, I did recommend remaining on baby aspirin and he agreed  -continue aspirin 81mg PO daily  -continue lipitor, goal LDL <70, will check with upcoming labs.     2. Dyslipidemia  Lipitor. Follow liver function.     3. PAF (paroxysmal atrial fibrillation) (HCC)  Self limited >30 years ago. Asymptomatic off medications.  Decision to be off anticoagulation was made long ago.   -CTM, ED precautions discussed. [id number] score of 2.      4. Peripheral arterial disease (HCC)  ABIs in the 80s noted on previous screening LE ultrasound exams.  -aspirin/statin.    5. Taylor-Parkinson-White (WPW) syndrome  Without ablation. Has been asymptomatic for decades and per report had testing that showed his max heart rate was not dangerously high in his SVT  -CTM, ED precautions.    6. Primary central sleep apnea  Mild and not on therapy  -CTM    7. Hypoalbuminemia  Dropping albumin, 2.9 and 2.6 previously. Interestingly he has no renal or GI symptoms and no edema. We have performed a thorough work-up including ruling out urinary and GI losses of albumin/protein.   -continue to monitor, no need for further work-up at this time. Suspect this may be his normal range genetically and this just falls out of the normal parameters for the lab value.   -recheck in 6 months.     8,. Elevated blood glucose - check hgbA1c with next labs.     9. Total Elevated bilirubin - has Gilbert's disease. Nothing to do.       Return in about 1 year (around 10/21/2022).    This evaluation was conducted via Zoom using secure and encrypted videoconferencing technology. The patient was in a private location in the [facility].    The patient's identity was confirmed and verbal consent was obtained for this virtual visit.        Rashaun Jenkins MD, [facility]  [place] [facility], [address]  [address] [address]  Phone: [phone]  Fax: [phone]

## 2021-10-22 ENCOUNTER — PATIENT MESSAGE (OUTPATIENT)
Dept: CARDIOLOGY | Facility: MEDICAL CENTER | Age: 71
End: 2021-10-22

## 2021-10-27 ENCOUNTER — PATIENT MESSAGE (OUTPATIENT)
Dept: CARDIOLOGY | Facility: MEDICAL CENTER | Age: 71
End: 2021-10-27

## 2021-10-27 NOTE — PATIENT COMMUNICATION
FAIZA signed letter.   Copy made and mailed to the patient.   Original mailed to the  information provided.

## 2021-11-24 ENCOUNTER — PATIENT MESSAGE (OUTPATIENT)
Dept: CARDIOLOGY | Facility: MEDICAL CENTER | Age: 71
End: 2021-11-24

## 2022-02-02 DIAGNOSIS — I48.0 PAF (PAROXYSMAL ATRIAL FIBRILLATION) (HCC): ICD-10-CM

## 2022-02-02 DIAGNOSIS — I25.10 CORONARY ARTERY CALCIFICATION SEEN ON CAT SCAN: ICD-10-CM

## 2022-02-02 DIAGNOSIS — E78.5 DYSLIPIDEMIA: Chronic | ICD-10-CM

## 2022-02-02 DIAGNOSIS — R73.01 IMPAIRED FASTING GLUCOSE: ICD-10-CM

## 2022-02-02 DIAGNOSIS — I73.9 PERIPHERAL ARTERIAL DISEASE (HCC): ICD-10-CM

## 2022-02-02 DIAGNOSIS — E78.00 HYPERCHOLESTEROLEMIA: ICD-10-CM

## 2022-02-04 ENCOUNTER — PATIENT MESSAGE (OUTPATIENT)
Dept: CARDIOLOGY | Facility: MEDICAL CENTER | Age: 72
End: 2022-02-04
Payer: COMMERCIAL

## 2022-02-04 ENCOUNTER — TELEPHONE (OUTPATIENT)
Dept: CARDIOLOGY | Facility: MEDICAL CENTER | Age: 72
End: 2022-02-04

## 2022-02-04 DIAGNOSIS — E78.00 HYPERCHOLESTEROLEMIA: ICD-10-CM

## 2022-02-04 DIAGNOSIS — E78.5 DYSLIPIDEMIA: ICD-10-CM

## 2022-02-04 NOTE — TELEPHONE ENCOUNTER
----- Message from Mitra Julien R.N. sent at 2/3/2022  9:12 AM PST -----  To FAIZA: No FV scheduled.

## 2022-02-04 NOTE — TELEPHONE ENCOUNTER
Labs reviewed and are stable. Cholesterol not quite at goal of LDL <70 so I recommend we start zetia 10mg PO daily along with his lipitor. Please let him know and Rx if he is ok with this, thank you!

## 2022-02-23 RX ORDER — EZETIMIBE 10 MG/1
10 TABLET ORAL DAILY
Qty: 90 TABLET | Refills: 3 | Status: SHIPPED | OUTPATIENT
Start: 2022-02-23 | End: 2022-02-25 | Stop reason: SDUPTHER

## 2022-02-25 RX ORDER — EZETIMIBE 10 MG/1
10 TABLET ORAL DAILY
Qty: 90 TABLET | Refills: 2 | Status: SHIPPED | OUTPATIENT
Start: 2022-02-25 | End: 2022-04-19

## 2022-02-25 RX ORDER — EZETIMIBE 10 MG/1
10 TABLET ORAL DAILY
Qty: 90 TABLET | Refills: 3 | Status: SHIPPED | OUTPATIENT
Start: 2022-02-25 | End: 2022-02-25 | Stop reason: SDUPTHER

## 2022-02-25 NOTE — PATIENT COMMUNICATION
Spoke to FAIZA and he stated there is likely no cheaper alternatives. Then ordered Nexletol 180mg PO daily or we can not prescribe any further medications and he can decrease his meat and animal product consumption within his diet.     Will complete next business day.

## 2022-04-05 ENCOUNTER — TELEPHONE (OUTPATIENT)
Dept: CARDIOLOGY | Facility: MEDICAL CENTER | Age: 72
End: 2022-04-05
Payer: COMMERCIAL

## 2022-04-05 NOTE — TELEPHONE ENCOUNTER
Call placed to pt as a reminder for blood test ordered by JM for upcoming virtual appt with JM. No answer, left voicemail asking if patient has had labs done, if so where. Was asked to give us a call back.    Pending call back.

## 2022-04-11 NOTE — TELEPHONE ENCOUNTER
Hello,      Pt called back regarding chart prep. Pt stated that he had the lab work completed at [facility] on 02-. Pt wanted to see if this has been received.      Best contact for pt:  PH:[phone]      Thank you,  Mulu BALL

## 2022-04-11 NOTE — TELEPHONE ENCOUNTER
Attempted to call pt regarding the labs we did receive from [facility] for upcoming appt with FAIZA duncan VM also reminded appointment time and date.

## 2022-04-19 ENCOUNTER — TELEPHONE (OUTPATIENT)
Dept: CARDIOLOGY | Facility: MEDICAL CENTER | Age: 72
End: 2022-04-19
Payer: COMMERCIAL

## 2022-04-19 ENCOUNTER — TELEMEDICINE (OUTPATIENT)
Dept: CARDIOLOGY | Facility: MEDICAL CENTER | Age: 72
End: 2022-04-19
Payer: MEDICARE

## 2022-04-19 VITALS
DIASTOLIC BLOOD PRESSURE: 83 MMHG | BODY MASS INDEX: 29.83 KG/M2 | WEIGHT: 245 LBS | SYSTOLIC BLOOD PRESSURE: 126 MMHG | HEIGHT: 76 IN | HEART RATE: 74 BPM

## 2022-04-19 DIAGNOSIS — I48.0 PAF (PAROXYSMAL ATRIAL FIBRILLATION) (HCC): ICD-10-CM

## 2022-04-19 DIAGNOSIS — E78.00 HYPERCHOLESTEROLEMIA: ICD-10-CM

## 2022-04-19 DIAGNOSIS — I73.9 PERIPHERAL ARTERIAL DISEASE (HCC): ICD-10-CM

## 2022-04-19 DIAGNOSIS — R73.01 IMPAIRED FASTING GLUCOSE: ICD-10-CM

## 2022-04-19 DIAGNOSIS — I45.6 WOLFF-PARKINSON-WHITE (WPW) SYNDROME: ICD-10-CM

## 2022-04-19 DIAGNOSIS — R00.1 SINUS BRADYCARDIA: ICD-10-CM

## 2022-04-19 DIAGNOSIS — I25.10 CORONARY ARTERY CALCIFICATION SEEN ON CAT SCAN: ICD-10-CM

## 2022-04-19 DIAGNOSIS — E78.5 DYSLIPIDEMIA: ICD-10-CM

## 2022-04-19 PROCEDURE — 99214 OFFICE O/P EST MOD 30 MIN: CPT | Mod: 95 | Performed by: INTERNAL MEDICINE

## 2022-04-19 RX ORDER — SILDENAFIL 100 MG/1
100 TABLET, FILM COATED ORAL PRN
COMMUNITY
Start: 2022-01-31 | End: 2023-09-19

## 2022-04-19 RX ORDER — EZETIMIBE 10 MG/1
10 TABLET ORAL DAILY
Qty: 90 TABLET | Refills: 3 | Status: SHIPPED | OUTPATIENT
Start: 2022-04-19 | End: 2023-01-19

## 2022-04-19 RX ORDER — ATORVASTATIN CALCIUM 80 MG/1
80 TABLET, FILM COATED ORAL DAILY
Qty: 90 TABLET | Refills: 3 | Status: SHIPPED | OUTPATIENT
Start: 2022-04-19 | End: 2023-01-19

## 2022-04-19 RX ORDER — POTASSIUM CHLORIDE 20 MEQ/1
20 TABLET, EXTENDED RELEASE ORAL DAILY
Qty: 90 TABLET | Refills: 3 | Status: SHIPPED | OUTPATIENT
Start: 2022-04-19 | End: 2023-01-19

## 2022-04-19 NOTE — PROGRESS NOTES
Cardiology Virtual Follow-up Consultation Note    Date of note:  4/19/2022  Primary Care Provider: Gene Galvan D.O.  Referring Provider: Ab Paz M.D.     Patient Name: Rhett Barrios   YOB: 1950  MRN:              [id number]    Chief Complaint: atrial fibrillation    History of Present Illness: Rhett Barrios is a 72 y.o. male whose current medical problems include hypertension, WPW, paroxysmal atrial fibrillation, CAC score of 336 in 2017, dyslipidemia, obesity, and PVD who I am seeing for follow-up.     At our visit,  3/12/2020:  Blood pressure at home in the 130s.     At our visit, 4/16/2021:  Hypertension well controlled.     In terms of his PVD, does have claudication at the end of when he's walking up hills.     At our visit, 10/21/2021: no updates     Interim Events:  Occasional SOB when going up stairs, has had some weight gain.     In terms of a fib, no palpitations, not on anticoagulation.     In terms of his CAC, no angina.     Does walk his dog daily, 20 minutes, asymptomatic.skiing without a problem.      ROS  Constitution: Negative for chills, fever  HENT: Negative for nosebleeds.    Eyes: Negative for vision loss in left eye and vision loss in right eye.   Respiratory: Negative for hemoptysis.    Gastrointestinal: Negative for hematemesis, hematochezia and melena.   Genitourinary: Negative for hematuria.   Neurological: Negative for focal weakness, numbness and paresthesias.         Past Medical History:   Diagnosis Date   • Back pain    • Chickenpox    • Coronary artery calcification seen on CAT scan 08/22/2017    Agatston score of 336 in left main, left anterior descending and circumflex predominately left anterior descending with none in the right coronary artery   • Cough    • Fatigue    • GERD (gastroesophageal reflux disease)    • Welsh measles    • Gilbert's syndrome    • Hyperlipidemia 2010   • Influenza    • Kidney stone    • Mumps    • Nasal drainage    • PAF  (paroxysmal atrial fibrillation) (HCC)     remote, age 37 without recurrence, WPW found at that time, never ablated. First diagnosed at [place], did stress testing which showed highest rate was not significant and thus they weaned him off medications.    • Peripheral arterial disease (HCC) 5/12/2017    Borderline ABIs of 0.8 on the right and 0.9 on the left, 0.9 bilaterally after exercise   • Pneumonia    • Sputum production    • Unspecified sleep apnea 09/11/2012    evaluated by Dr. Gutierrez, [facility]   • Taylor-Parkinson-White (WPW) syndrome     Presented with atrial fibrillation and found to have long refractory period Bright bundle at age 37 with negative coronary arteriogram then. 1st evaluated by Dr. Caal in 2010 and Dr. Doan subsequently. Referred for evaluation to Dr. Terrell at [facility], 12/2/2014,  but then decided not to go and has had no recurrent atrial fibrillation since age 37 nor any other atrial tachyarrhythmias         Past Surgical History:   Procedure Laterality Date   • ARTHROSCOPY, KNEE     • KNEE ARTHROSCOPY Right    • KNEE ARTHROTOMY Left    • VASECTOMY           Current Outpatient Medications   Medication Sig Dispense Refill   • sildenafil citrate (VIAGRA) 100 MG tablet Take 100 mg by mouth as needed.     • ezetimibe (ZETIA) 10 MG Tab Take 1 Tablet by mouth every day. 90 Tablet 2   • potassium chloride SA (KDUR) 20 MEQ Tab CR Take 1 Tablet by mouth every day. 90 Tablet 3   • atorvastatin (LIPITOR) 80 MG tablet Take 1 Tablet by mouth every day. 90 Tablet 3   • chlorthalidone (HYGROTON) 25 MG Tab Take 25 mg by mouth every day.     • omeprazole (PRILOSEC) 40 MG delayed-release capsule Take 40 mg by mouth every day.     • Flaxseed-Priscila Prim-Borage (FLAX OIL XTRA) Cap Take  by mouth.     • Ascorbic Acid (ANEUDY-C PO) Take  by mouth.     • aspirin EC (ECOTRIN) 81 MG TBEC Take 81 mg by mouth every day.     • Coenzyme Q10 (CO Q 10) 100 MG CAPS Take 100 mg by mouth every day.     • vitamin D  "(CHOLECALCIFEROL) 1000 UNIT TABS Take 1,000 Units by mouth every day.       No current facility-administered medications for this visit.         Allergies   Allergen Reactions   • Digoxin Unspecified     \"Patient has WPW and was informed to not take this medicine.\"   • Norvasc [Amlodipine]      Leg swelling         Family History   Problem Relation Age of Onset   • Cancer Mother         Lymphoma   • Heart Disease Father 80        CAD and PPM,  at 94         Social History     Socioeconomic History   • Marital status:      Spouse name: Not on file   • Number of children: Not on file   • Years of education: Not on file   • Highest education level: Not on file   Occupational History   • Not on file   Tobacco Use   • Smoking status: Never Smoker   • Smokeless tobacco: Never Used   Vaping Use   • Vaping Use: Never used   Substance and Sexual Activity   • Alcohol use: Yes     Comment: Very rarely   • Drug use: No   • Sexual activity: Not on file   Other Topics Concern   • Not on file   Social History Narrative         Social Determinants of Health     Financial Resource Strain: Not on file   Food Insecurity: Not on file   Transportation Needs: Not on file   Physical Activity: Not on file   Stress: Not on file   Social Connections: Not on file   Intimate Partner Violence: Not on file   Housing Stability: Not on file         Physical Exam:  Ambulatory Vitals  /83 (BP Location: Left arm, Patient Position: Sitting, BP Cuff Size: Adult)   Pulse 74   Ht 1.93 m (6' 4\")   Wt 111 kg (245 lb)    Oxygen Therapy:     BP Readings from Last 4 Encounters:   22 126/83   10/21/21 (!) 0/0   21 136/83   20 148/90       Weight/BMI: Body mass index is 29.82 kg/m².  Wt Readings from Last 4 Encounters:   22 111 kg (245 lb)   10/21/21 111 kg (245 lb)   21 112 kg (248 lb)   20 112 kg (247 lb)       General: No apparent distress  Eyes: nl conjunctiva  Neck: JVP appeared normal from " afar  Lungs: normal respiratory effort  Neurological: Moving upper extremities.   Psychiatric: Appropriate affect, A/O x 3, intact judgement and insight  Skin: no visible rashes      Lab Data Review:  No results found for: CHOLSTRLTOT, LDL, HDL, TRIGLYCERIDE    No results found for: SODIUM, POTASSIUM, CHLORIDE, CO2, GLUCOSE, BUN, CREATININE, BUNCREATRAT, GLOMRATE  No results found for: ALKPHOSPHAT, ASTSGOT, ALTSGPT, TBILIRUBIN   No results found for: WBC  No components found for: HBGA1C  No components found for: TROPONIN  No results found for: BNP    OSH labs 3/4/2020:  Sodium 143  K+ 4.5  Bun 13  Creatinine 1.1  LFTs WNL except albumin 2.6 and TB of 1.1.    HDL 58  LDL 91  tg 78    OSH labs 10/2020:  LDL 78, almost at goal.   tg 73  HDL 67        OSH labs reviewed 3/12/2021:  Alpha-1-antitrypsin serum 183 (ref 101-187)  Sodium 142  Potassium 3.2  Bun 20  Creatinine 1.2  eGFR 58  Co2 25  Cl 105  Calcium 8.7  Urine creatinine 256.7mg/dL  Urine protein 11  Ratio - 0.04  UA dipstick negative for protein.     Stool anti-trypsin negative.     Cardiac Imaging and Procedures Review:    EKG dated  : My personal interpretation is NSR, incomplete RBBB, non-specific st changes.     Echo dated 3/2018  Normal.     Exercise stress testing (2018): normal. Treadmill test. Under media tab. 8 minutes on griselda protocol.       Radiology test Review:    Carotid Artery Ultrasound (3/2018):   Mild bilateral plaque.   PFTs 2018: normal.     CAC score of 336 in 2017      Medical Decision Makin. Coronary artery calcification seen on CAT scan  Discussed use of aspirin for primary prevention and this is an indeterminate range (CAC in the 300s), but given his h/o a fib, I did recommend remaining on baby aspirin and he agreed  -continue aspirin 81mg PO daily  -continue lipitor, goal LDL <70, check in 4 months.     2. Dyslipidemia  Lipitor. Follow liver function.     3. PAF (paroxysmal atrial fibrillation) (HCC)  Self  limited >30 years ago. Asymptomatic off medications. Decision to be off anticoagulation was made long ago.   -CTM, ED precautions discussed. [id number] score of 2.      4. Peripheral arterial disease (HCC)  ABIs in the 80s noted on previous screening LE ultrasound exams.  -aspirin/statin.    5. Taylor-Parkinson-White (WPW) syndrome  Without ablation. Has been asymptomatic for decades and per report had testing that showed his max heart rate was not dangerously high in his SVT  -CTM, ED precautions.    6. Primary central sleep apnea  Mild and not on therapy  -CTM    7. Hypoalbuminemia  Dropping albumin, 2.9 and 2.6 previously. Interestingly he has no renal or GI symptoms and no edema. We have performed a thorough work-up including ruling out urinary and GI losses of albumin/protein.   -continue to monitor, no need for further work-up at this time. Suspect this may be his normal range genetically and this just falls out of the normal parameters for the lab value.   -recheck prn    8,. Elevated blood glucose - check hgbA1c with next labs.     9. Total Elevated bilirubin - has Gilbert's disease. Nothing to do.       Return in about 1 year (around 4/19/2023).    This evaluation was conducted via Zoom using secure and encrypted videoconferencing technology. The patient was in their home in the [facility].    The patient's identity was confirmed and verbal consent was obtained for this virtual visit.      Rashaun Jenkins MD, [facility], [address]  [address] [address]  Phone: [phone]  Fax: [phone]

## 2022-10-24 DIAGNOSIS — E78.00 HYPERCHOLESTEROLEMIA: ICD-10-CM

## 2022-10-24 DIAGNOSIS — E78.5 DYSLIPIDEMIA: ICD-10-CM

## 2022-10-24 DIAGNOSIS — R73.01 IMPAIRED FASTING GLUCOSE: ICD-10-CM

## 2022-10-24 DIAGNOSIS — I48.0 PAF (PAROXYSMAL ATRIAL FIBRILLATION) (HCC): ICD-10-CM

## 2022-10-24 DIAGNOSIS — I73.9 PERIPHERAL ARTERIAL DISEASE (HCC): ICD-10-CM

## 2022-10-24 DIAGNOSIS — I25.10 CORONARY ARTERY CALCIFICATION SEEN ON CAT SCAN: ICD-10-CM

## 2023-01-06 ENCOUNTER — PATIENT MESSAGE (OUTPATIENT)
Dept: CARDIOLOGY | Facility: MEDICAL CENTER | Age: 73
End: 2023-01-06
Payer: COMMERCIAL

## 2023-01-19 ENCOUNTER — TELEMEDICINE (OUTPATIENT)
Dept: CARDIOLOGY | Facility: MEDICAL CENTER | Age: 73
End: 2023-01-19
Payer: MEDICARE

## 2023-01-19 VITALS
SYSTOLIC BLOOD PRESSURE: 125 MMHG | BODY MASS INDEX: 30.44 KG/M2 | WEIGHT: 250 LBS | HEART RATE: 70 BPM | HEIGHT: 76 IN | DIASTOLIC BLOOD PRESSURE: 75 MMHG

## 2023-01-19 DIAGNOSIS — I48.0 PAF (PAROXYSMAL ATRIAL FIBRILLATION) (HCC): ICD-10-CM

## 2023-01-19 DIAGNOSIS — R73.01 IMPAIRED FASTING GLUCOSE: ICD-10-CM

## 2023-01-19 DIAGNOSIS — I25.10 CORONARY ARTERY CALCIFICATION SEEN ON CAT SCAN: ICD-10-CM

## 2023-01-19 DIAGNOSIS — I73.9 PERIPHERAL ARTERIAL DISEASE (HCC): ICD-10-CM

## 2023-01-19 DIAGNOSIS — E78.5 DYSLIPIDEMIA: ICD-10-CM

## 2023-01-19 DIAGNOSIS — E78.00 HYPERCHOLESTEROLEMIA: ICD-10-CM

## 2023-01-19 PROCEDURE — 99214 OFFICE O/P EST MOD 30 MIN: CPT | Mod: 95 | Performed by: INTERNAL MEDICINE

## 2023-01-19 RX ORDER — EZETIMIBE 10 MG/1
10 TABLET ORAL DAILY
Qty: 100 TABLET | Refills: 3 | Status: SHIPPED | OUTPATIENT
Start: 2023-01-19 | End: 2023-09-19

## 2023-01-19 RX ORDER — POTASSIUM CHLORIDE 20 MEQ/1
20 TABLET, EXTENDED RELEASE ORAL DAILY
Qty: 100 TABLET | Refills: 3 | Status: SHIPPED | OUTPATIENT
Start: 2023-01-19 | End: 2023-06-09 | Stop reason: SDUPTHER

## 2023-01-19 RX ORDER — ATORVASTATIN CALCIUM 80 MG/1
80 TABLET, FILM COATED ORAL DAILY
Qty: 100 TABLET | Refills: 3 | Status: SHIPPED | OUTPATIENT
Start: 2023-01-19 | End: 2023-09-19

## 2023-01-19 RX ORDER — CIPROFLOXACIN 500 MG/1
TABLET, FILM COATED ORAL
COMMUNITY
Start: 2022-12-01 | End: 2023-01-19

## 2023-01-19 RX ORDER — POTASSIUM CHLORIDE 1500 MG/1
20 TABLET, EXTENDED RELEASE ORAL
COMMUNITY
Start: 2023-01-12 | End: 2023-01-19

## 2023-01-19 RX ORDER — CHLORTHALIDONE 25 MG/1
25 TABLET ORAL DAILY
Qty: 100 TABLET | Refills: 3 | Status: SHIPPED | OUTPATIENT
Start: 2023-01-19 | End: 2023-09-19

## 2023-01-19 RX ORDER — FAMOTIDINE 40 MG/1
40 TABLET, FILM COATED ORAL DAILY
COMMUNITY
Start: 2023-01-12

## 2023-01-20 NOTE — PROGRESS NOTES
Cardiology Virtual Follow-up Consultation Note    Date of note:  1/19/2023  Primary Care Provider: Gene Galvan D.O.  Referring Provider: Ab Paz M.D.     Patient Name: Rhett Barrios   YOB: 1950  MRN:              [id number]    Chief Complaint: atrial fibrillation    History of Present Illness: Rhett Barrios is a 72 y.o. male whose current medical problems include hypertension, WPW, paroxysmal atrial fibrillation, CAC score of 336 in 2017, dyslipidemia, obesity, and PVD who I am seeing for follow-up.     At our visit,  3/12/2020:  Blood pressure at home in the 130s.     At our visit, 4/16/2021:  Hypertension well controlled.     In terms of his PVD, does have claudication at the end of when he's walking up hills.     At our visit, 10/21/2021: no updates     At our visit, 4/19/2022:  Occasional SOB when going up stairs, has had some weight gain.     Does walk his dog daily, 20 minutes, asymptomatic. Skiing without a problem.      Interim Events:  In terms of a fib, no palpitations, not on anticoagulation.     In terms of his CAC, no angina.     In [place] they have 15 feet of snow so far this month. Shoveling snow and skiing without symptoms.     Has had two UTIs. Gonna f/u with urology.     ROS  Constitution: Negative for chills, fever  HENT: Negative for nosebleeds.    Eyes: Negative for vision loss in left eye and vision loss in right eye.   Respiratory: Negative for hemoptysis.    Gastrointestinal: Negative for hematemesis, hematochezia and melena.   Genitourinary: Negative for hematuria.   Neurological: Negative for focal weakness, numbness and paresthesias.           Past Medical History:   Diagnosis Date    Back pain     Chickenpox     Coronary artery calcification seen on CAT scan 08/22/2017    Agatston score of 336 in left main, left anterior descending and circumflex predominately left anterior descending with none in the right coronary artery    Cough     Fatigue     GERD  (gastroesophageal reflux disease)     Namibian measles     Gilbert's syndrome     Hyperlipidemia 2010    Influenza     Kidney stone     Mumps     Nasal drainage     PAF (paroxysmal atrial fibrillation) (HCC)     remote, age 37 without recurrence, WPW found at that time, never ablated. First diagnosed at [place], did stress testing which showed highest rate was not significant and thus they weaned him off medications.     Peripheral arterial disease (HCC) 5/12/2017    Borderline ABIs of 0.8 on the right and 0.9 on the left, 0.9 bilaterally after exercise    Pneumonia     Sputum production     Unspecified sleep apnea 09/11/2012    evaluated by Dr. Gutierrez, [facility]    Taylor-Parkinson-White (WPW) syndrome     Presented with atrial fibrillation and found to have long refractory period Bright bundle at age 37 with negative coronary arteriogram then. 1st evaluated by Dr. Caal in 2010 and Dr. Doan subsequently. Referred for evaluation to Dr. Terrell at [facility], 12/2/2014,  but then decided not to go and has had no recurrent atrial fibrillation since age 37 nor any other atrial tachyarrhythmias         Past Surgical History:   Procedure Laterality Date    ARTHROSCOPY, KNEE      KNEE ARTHROSCOPY Right     KNEE ARTHROTOMY Left     VASECTOMY           Current Outpatient Medications   Medication Sig Dispense Refill    famotidine (PEPCID) 40 MG Tab Take 40 mg by mouth 2 times a day.      sildenafil citrate (VIAGRA) 100 MG tablet Take 100 mg by mouth as needed.      atorvastatin (LIPITOR) 80 MG tablet Take 1 Tablet by mouth every day. 90 Tablet 3    ezetimibe (ZETIA) 10 MG Tab Take 1 Tablet by mouth every day. 90 Tablet 3    potassium chloride SA (KDUR) 20 MEQ Tab CR Take 1 Tablet by mouth every day. 90 Tablet 3    chlorthalidone (HYGROTON) 25 MG Tab Take 25 mg by mouth every day.      omeprazole (PRILOSEC) 40 MG delayed-release capsule Take 40 mg by mouth every day.      Flaxseed-Priscila Prim-Borage (FLAX OIL XTRA) Cap Take  by mouth.    "   aspirin EC (ECOTRIN) 81 MG TBEC Take 81 mg by mouth every day.      Coenzyme Q10 (CO Q 10) 100 MG CAPS Take 100 mg by mouth every day.      vitamin D (CHOLECALCIFEROL) 1000 UNIT TABS Take 1,000 Units by mouth every day.      ciprofloxacin (CIPRO) 500 MG Tab TAKE ONE TABLET BY MOUTH EVERY 12 HOURS FOR 7 DAYS (Patient not taking: Reported on 2023)      Ascorbic Acid (ANEUDY-C PO) Take  by mouth.       No current facility-administered medications for this visit.         Allergies   Allergen Reactions    Digoxin Unspecified     \"Patient has WPW and was informed to not take this medicine.\"    Norvasc [Amlodipine]      Leg swelling         Family History   Problem Relation Age of Onset    Cancer Mother         Lymphoma    Heart Disease Father 80        CAD and PPM,  at 94         Social History     Socioeconomic History    Marital status:      Spouse name: Not on file    Number of children: Not on file    Years of education: Not on file    Highest education level: Not on file   Occupational History    Not on file   Tobacco Use    Smoking status: Never    Smokeless tobacco: Never   Vaping Use    Vaping Use: Never used   Substance and Sexual Activity    Alcohol use: Yes     Comment: Very rarely    Drug use: No    Sexual activity: Not on file   Other Topics Concern    Not on file   Social History Narrative         Social Determinants of Health     Financial Resource Strain: Not on file   Food Insecurity: Not on file   Transportation Needs: Not on file   Physical Activity: Not on file   Stress: Not on file   Social Connections: Not on file   Intimate Partner Violence: Not on file   Housing Stability: Not on file         Physical Exam:  Ambulatory Vitals  /75 (BP Location: Left arm, Patient Position: Sitting)   Pulse 70   Ht 1.93 m (6' 4\")   Wt 113 kg (250 lb)    Oxygen Therapy:     BP Readings from Last 4 Encounters:   23 125/75   22 126/83   10/21/21 (!) 0/0   21 136/83 "       Weight/BMI: Body mass index is 30.43 kg/m².  Wt Readings from Last 4 Encounters:   23 113 kg (250 lb)   22 111 kg (245 lb)   10/21/21 111 kg (245 lb)   21 112 kg (248 lb)       General: No apparent distress  Eyes: nl conjunctiva  Neck: JVP appeared normal from afar  Lungs: normal respiratory effort  Neurological: Moving upper extremities.   Psychiatric: Appropriate affect, A/O x 3, intact judgement and insight  Skin: no visible rashes    Lab Data Review:  No results found for: CHOLSTRLTOT, LDL, HDL, TRIGLYCERIDE    No results found for: SODIUM, POTASSIUM, CHLORIDE, CO2, GLUCOSE, BUN, CREATININE, BUNCREATRAT, GLOMRATE  No results found for: ALKPHOSPHAT, ASTSGOT, ALTSGPT, TBILIRUBIN   No results found for: WBC  No components found for: HBGA1C  No components found for: TROPONIN  No results found for: BNP    OSH labs 3/4/2020:  Sodium 143  K+ 4.5  Bun 13  Creatinine 1.1  LFTs WNL except albumin 2.6 and TB of 1.1.    HDL 58  LDL 91  tg 78    OSH labs 10/2020:  LDL 78, almost at goal.   tg 73  HDL 67        OSH labs reviewed 3/12/2021:  Alpha-1-antitrypsin serum 183 (ref 101-187)  Sodium 142  Potassium 3.2  Bun 20  Creatinine 1.2  eGFR 58  Co2 25  Cl 105  Calcium 8.7  Urine creatinine 256.7mg/dL  Urine protein 11  Ratio - 0.04  UA dipstick negative for protein.     Stool anti-trypsin negative.     OSH 10/21/2022:  BUN 19  Creatinine 1.1  Sodium 140  Potassium 4.2        Cardiac Imaging and Procedures Review:    EKG dated  : My personal interpretation is NSR, incomplete RBBB, non-specific st changes.     Echo dated 3/2018  Normal.     Exercise stress testing (2018): normal. Treadmill test. Under media tab. 8 minutes on griselda protocol.       Radiology test Review:    Carotid Artery Ultrasound (3/2018):   Mild bilateral plaque.   PFTs 2018: normal.     CAC score of 336 in 2017      Medical Decision Makin. Coronary artery calcification seen on CAT scan  Discussed use of  aspirin for primary prevention and this is an indeterminate range (CAC in the 300s), but given his h/o a fib, I did recommend remaining on baby aspirin and he agreed  -continue aspirin 81mg PO daily  -continue lipitor    2. Dyslipidemia  Lipitor. Follow liver function.     3. PAF (paroxysmal atrial fibrillation) (HCC)  Self limited >30 years ago. Asymptomatic off medications. Decision to be off anticoagulation was made long ago.   -CTM, ED precautions discussed. [id number] score of 2.      4. Peripheral arterial disease (HCC)  ABIs in the 80s noted on previous screening LE ultrasound exams.  -aspirin/statin.    5. Taylor-Parkinson-White (WPW) syndrome  Without ablation. Has been asymptomatic for decades and per report had testing that showed his max heart rate was not dangerously high in his SVT  -CTM, ED precautions.    6. Primary central sleep apnea  Mild and not on therapy  -CTM    7. Hypoalbuminemia  Dropping albumin, 2.9 and 2.6 previously. Interestingly he has no renal or GI symptoms and no edema. We have performed a thorough work-up including ruling out urinary and GI losses of albumin/protein.   -continue to monitor, no need for further work-up at this time. Suspect this may be his normal range genetically and this just falls out of the normal parameters for the lab value.   -recheck prn    8,. Elevated blood glucose - follow hgbA1c yearly.     9. Total Elevated bilirubin - has Gilbert's disease. Nothing to do.     Return in about 1 year (around 1/19/2024).    This evaluation was conducted via Zoom using secure and encrypted videoconferencing technology. The patient was in their home in the [facility].    The patient's identity was confirmed and verbal consent was obtained for this virtual visit.        Rashaun Jenkins MD, [facility]  [place] [facility], [facility].  [address] [address]  Phone: [phone]  Fax:  [phone]

## 2023-06-01 ENCOUNTER — PATIENT MESSAGE (OUTPATIENT)
Dept: CARDIOLOGY | Facility: MEDICAL CENTER | Age: 73
End: 2023-06-01
Payer: COMMERCIAL

## 2023-06-05 DIAGNOSIS — I48.0 PAF (PAROXYSMAL ATRIAL FIBRILLATION) (HCC): ICD-10-CM

## 2023-06-05 DIAGNOSIS — I73.9 PERIPHERAL ARTERIAL DISEASE (HCC): ICD-10-CM

## 2023-06-05 DIAGNOSIS — I25.10 CORONARY ARTERY CALCIFICATION SEEN ON CAT SCAN: ICD-10-CM

## 2023-06-09 ENCOUNTER — PATIENT MESSAGE (OUTPATIENT)
Dept: CARDIOLOGY | Facility: MEDICAL CENTER | Age: 73
End: 2023-06-09
Payer: COMMERCIAL

## 2023-06-09 ENCOUNTER — TELEPHONE (OUTPATIENT)
Dept: CARDIOLOGY | Facility: MEDICAL CENTER | Age: 73
End: 2023-06-09
Payer: COMMERCIAL

## 2023-06-09 DIAGNOSIS — E87.6 HYPOKALEMIA: ICD-10-CM

## 2023-06-09 RX ORDER — POTASSIUM CHLORIDE 20 MEQ/1
40 TABLET, EXTENDED RELEASE ORAL DAILY
Qty: 30 TABLET | Refills: 0 | COMMUNITY
Start: 2023-06-09 | End: 2023-06-14 | Stop reason: SDUPTHER

## 2023-06-09 NOTE — TELEPHONE ENCOUNTER
Called pt [phone]  No answer, left DETAILED VM regarding  recommendations. Questions can call [phone]    ----------------------------------------  Mychart message to pt         potassium chloride SA (KDUR) 20 MEQ Tab CR 30 Tablet 0/0 6/9/2023     Sig - Route: Take 2 Tablets by mouth every day. - Oral    Class: Historical Med    Notes to Pharmacy: Dosage increase per JM see tele encounter dated 6/9/23

## 2023-06-09 NOTE — PATIENT INSTRUCTIONS
[facility]    Preoperative Nutrition Protocol      1. Did the patient receive dietary education pre-operatively from the surgeon's office or [facility] Registered Dietitian?  • No, the patient did not receive dietary education    2. Did the patient consume immunonutrition prior to surgery?  • No, the patient consumed no immunonutrition    3. Did the patient consume 50 g (1 bottles) of maltodextrin (High Carb Drink) 2-3 hours prior to surgery?  • Yes, the patient consumed 100% (1 bottles) of the maltodextrin    4. Was the patient allowed clear liquids up until 2-3 hours before surgery?  • Yes, the patient was allowed clear liquids up until 2-3 hours before surgery   Please start taking norvasc (also called amlodipine) 2.5mg once a day for high blood pressure.     Please increase your atorvastatin to 80mg once a day for high cholesterol.     Please get fasting blood tests in 6 months.

## 2023-06-09 NOTE — TELEPHONE ENCOUNTER
Labs reviewed and showed potassium of 3.3, likely due to chlorthalidone.     Let's increase potassium to 40mEq daily.     Thank you!

## 2023-06-14 RX ORDER — POTASSIUM CHLORIDE 20 MEQ/1
40 TABLET, EXTENDED RELEASE ORAL DAILY
Qty: 180 TABLET | Refills: 2 | Status: SHIPPED | OUTPATIENT
Start: 2023-06-14 | End: 2024-03-08

## 2023-09-08 ENCOUNTER — TELEPHONE (OUTPATIENT)
Dept: CARDIOLOGY | Facility: MEDICAL CENTER | Age: 73
End: 2023-09-08

## 2023-09-08 NOTE — TELEPHONE ENCOUNTER
Called left message for patient about upcoming appointment, in regards that his labs were only partially completed. To please gives us a call back

## 2023-09-11 ENCOUNTER — TELEPHONE (OUTPATIENT)
Dept: CARDIOLOGY | Facility: MEDICAL CENTER | Age: 73
End: 2023-09-11

## 2023-09-11 NOTE — TELEPHONE ENCOUNTER
Spoke with patient he didn't have the order for the Magnesium lab order. Reprinted and faxed to him and he stated he would get it completed.

## 2023-09-11 NOTE — TELEPHONE ENCOUNTER
Patient returned my phone call, called him back on the number he requested and there was no answer. Left message again about labs that incomplete.

## 2023-09-11 NOTE — TELEPHONE ENCOUNTER
FAIZA    Caller: Rhett Barrois     Topic/issue: Patient returned phone call. Please advise.     Callback Number: [phone]    Thank you,     Laura ACUNA

## 2023-09-18 DIAGNOSIS — I73.9 PERIPHERAL ARTERIAL DISEASE (HCC): ICD-10-CM

## 2023-09-18 DIAGNOSIS — I48.0 PAF (PAROXYSMAL ATRIAL FIBRILLATION) (HCC): ICD-10-CM

## 2023-09-18 DIAGNOSIS — I25.10 CORONARY ARTERY CALCIFICATION SEEN ON CAT SCAN: ICD-10-CM

## 2023-09-19 ENCOUNTER — PATIENT MESSAGE (OUTPATIENT)
Dept: CARDIOLOGY | Facility: MEDICAL CENTER | Age: 73
End: 2023-09-19

## 2023-09-19 ENCOUNTER — TELEMEDICINE (OUTPATIENT)
Dept: CARDIOLOGY | Facility: MEDICAL CENTER | Age: 73
End: 2023-09-19
Attending: INTERNAL MEDICINE
Payer: MEDICARE

## 2023-09-19 VITALS
WEIGHT: 242 LBS | HEART RATE: 51 BPM | BODY MASS INDEX: 29.46 KG/M2 | SYSTOLIC BLOOD PRESSURE: 128 MMHG | DIASTOLIC BLOOD PRESSURE: 77 MMHG

## 2023-09-19 DIAGNOSIS — E78.5 DYSLIPIDEMIA: Chronic | ICD-10-CM

## 2023-09-19 DIAGNOSIS — R73.01 IMPAIRED FASTING GLUCOSE: ICD-10-CM

## 2023-09-19 DIAGNOSIS — I73.9 PERIPHERAL ARTERIAL DISEASE (HCC): ICD-10-CM

## 2023-09-19 DIAGNOSIS — R00.1 SINUS BRADYCARDIA: ICD-10-CM

## 2023-09-19 DIAGNOSIS — E78.00 HYPERCHOLESTEROLEMIA: ICD-10-CM

## 2023-09-19 DIAGNOSIS — R06.02 SHORTNESS OF BREATH: ICD-10-CM

## 2023-09-19 DIAGNOSIS — I25.10 CORONARY ARTERY CALCIFICATION SEEN ON CAT SCAN: ICD-10-CM

## 2023-09-19 DIAGNOSIS — I45.6 WOLFF-PARKINSON-WHITE (WPW) SYNDROME: ICD-10-CM

## 2023-09-19 DIAGNOSIS — I48.0 PAF (PAROXYSMAL ATRIAL FIBRILLATION) (HCC): ICD-10-CM

## 2023-09-19 DIAGNOSIS — I10 ESSENTIAL HYPERTENSION, BENIGN: ICD-10-CM

## 2023-09-19 PROCEDURE — 99214 OFFICE O/P EST MOD 30 MIN: CPT | Mod: 95 | Performed by: INTERNAL MEDICINE

## 2023-09-19 RX ORDER — ATORVASTATIN CALCIUM 80 MG/1
80 TABLET, FILM COATED ORAL DAILY
Qty: 100 TABLET | Refills: 3 | Status: SHIPPED | OUTPATIENT
Start: 2023-09-19

## 2023-09-19 RX ORDER — TADALAFIL 5 MG/1
5 TABLET ORAL
COMMUNITY
Start: 2023-07-12

## 2023-09-19 RX ORDER — EZETIMIBE 10 MG/1
10 TABLET ORAL DAILY
Qty: 100 TABLET | Refills: 3 | Status: SHIPPED | OUTPATIENT
Start: 2023-09-19

## 2023-09-19 RX ORDER — CHLORTHALIDONE 25 MG/1
25 TABLET ORAL DAILY
Qty: 100 TABLET | Refills: 3 | Status: SHIPPED | OUTPATIENT
Start: 2023-09-19

## 2023-09-19 NOTE — PROGRESS NOTES
Cardiology Virtual Follow-up Consultation Note    Date of note:  9/19/2023  Primary Care Provider: Geen Galvan D.O.  Referring Provider: Ab Paz M.D.     Patient Name: Rhett Barrios   YOB: 1950  MRN:              [id number]    Chief Complaint: SOB    History of Present Illness: Rhett Barrios is a 73 y.o. male whose current medical problems include hypertension, WPW, paroxysmal atrial fibrillation, CAC score of 336 in 2017, dyslipidemia, obesity, and PVD who I am seeing for follow-up.     At our visit,  3/12/2020:  Blood pressure at home in the 130s.     At our visit, 4/16/2021:  Hypertension well controlled.     In terms of his PVD, does have claudication at the end of when he's walking up hills.     At our visit, 10/21/2021: no updates     At our visit, 4/19/2022:  Occasional SOB when going up stairs, has had some weight gain.     Does walk his dog daily, 20 minutes, asymptomatic. Skiing without a problem.      At our visit, 1/19/2023:  In terms of a fib, no palpitations, not on anticoagulation.     In [place] they have 15 feet of snow so far this month. Shoveling snow and skiing without symptoms.     Has had two UTIs. Gonna f/u with urology.     Interim Events:  Potassium 3.3, improved on higher potassium.     Started getting cramps, started taking a supplement and improved.     No palpitations.     In terms of his CAC, no angina.     Walking the dog and skiing for exercise.     Progressive shortness of breath going up and down his stairs despite maintaining his exercise regimen.     ROS  No fevers, CVA symptoms, bleeding issues.           Past Medical History:   Diagnosis Date    Back pain     Chickenpox     Coronary artery calcification seen on CAT scan 08/22/2017    Agatston score of 336 in left main, left anterior descending and circumflex predominately left anterior descending with none in the right coronary artery    Cough     Fatigue     GERD (gastroesophageal reflux disease)      [place] measles     Gilbert's syndrome     Hyperlipidemia 2010    Influenza     Kidney stone     Mumps     Nasal drainage     PAF (paroxysmal atrial fibrillation) (HCC)     remote, age 37 without recurrence, WPW found at that time, never ablated. First diagnosed at [place], did stress testing which showed highest rate was not significant and thus they weaned him off medications.     Peripheral arterial disease (HCC) 5/12/2017    Borderline ABIs of 0.8 on the right and 0.9 on the left, 0.9 bilaterally after exercise    Pneumonia     Sputum production     Unspecified sleep apnea 09/11/2012    evaluated by Dr. Gutierrez, [facility]    Taylor-Parkinson-White (WPW) syndrome     Presented with atrial fibrillation and found to have long refractory period Bright bundle at age 37 with negative coronary arteriogram then. 1st evaluated by Dr. Caal in 2010 and Dr. Doan subsequently. Referred for evaluation to Dr. Terrell at [email or web address]], 12/2/2014,  but then decided not to go and has had no recurrent atrial fibrillation since age 37 nor any other atrial tachyarrhythmias         Past Surgical History:   Procedure Laterality Date    ARTHROSCOPY, KNEE      KNEE ARTHROSCOPY Right     KNEE ARTHROTOMY Left     VASECTOMY           Current Outpatient Medications   Medication Sig Dispense Refill    tadalafil (CIALIS) 5 MG tablet Take 5 mg by mouth every day.      potassium chloride SA (KDUR) 20 MEQ Tab CR Take 2 Tablets by mouth every day. 180 Tablet 2    famotidine (PEPCID) 40 MG Tab Take 40 mg by mouth 2 times a day.      chlorthalidone (HYGROTON) 25 MG Tab Take 1 Tablet by mouth every day. 100 Tablet 3    ezetimibe (ZETIA) 10 MG Tab Take 1 Tablet by mouth every day. 100 Tablet 3    atorvastatin (LIPITOR) 80 MG tablet Take 1 Tablet by mouth every day. 100 Tablet 3    omeprazole (PRILOSEC) 40 MG delayed-release capsule Take 40 mg by mouth every day.      aspirin EC (ECOTRIN) 81 MG TBEC Take 81 mg by mouth every day.      Coenzyme Q10 (CO Q 10)  "100 MG CAPS Take 100 mg by mouth every day.      vitamin D (CHOLECALCIFEROL) 1000 UNIT TABS Take 1,000 Units by mouth every day.      sildenafil citrate (VIAGRA) 100 MG tablet Take 100 mg by mouth as needed.      Flaxseed-Priscila Prim-Borage (FLAX OIL XTRA) Cap Take  by mouth.       No current facility-administered medications for this visit.         Allergies   Allergen Reactions    Digoxin Unspecified     \"Patient has WPW and was informed to not take this medicine.\"    Norvasc [Amlodipine]      Leg swelling         Family History   Problem Relation Age of Onset    Cancer Mother         Lymphoma    Heart Disease Father 80        CAD and PPM,  at 94         Social History     Socioeconomic History    Marital status:      Spouse name: Not on file    Number of children: Not on file    Years of education: Not on file    Highest education level: Not on file   Occupational History    Not on file   Tobacco Use    Smoking status: Never    Smokeless tobacco: Never   Vaping Use    Vaping Use: Never used   Substance and Sexual Activity    Alcohol use: Yes     Comment: Very rarely    Drug use: No    Sexual activity: Not on file   Other Topics Concern    Not on file   Social History Narrative         Social Determinants of Health     Financial Resource Strain: Not on file   Food Insecurity: Not on file   Transportation Needs: Not on file   Physical Activity: Not on file   Stress: Not on file   Social Connections: Not on file   Intimate Partner Violence: Not on file   Housing Stability: Not on file         Physical Exam:  Ambulatory Vitals  /77 (BP Location: Left arm, Patient Position: Sitting, BP Cuff Size: Adult)   Pulse (!) 51   Wt 110 kg (242 lb)    Oxygen Therapy:     BP Readings from Last 4 Encounters:   23 128/77   23 125/75   22 126/83   10/21/21 (!) 0/0       Weight/BMI: Body mass index is 29.46 kg/m².  Wt Readings from Last 4 Encounters:   23 110 kg (242 lb)   23 113 " "kg (250 lb)   22 111 kg (245 lb)   10/21/21 111 kg (245 lb)       General: No apparent distress  Eyes: nl conjunctiva  Neck: JVP appeared normal from afar  Lungs: normal respiratory effort  Neurological: Moving upper extremities.   Psychiatric: Appropriate affect, A/O x 3, intact judgement and insight  Skin: no visible rashes      Lab Data Review:  No results found for: \"CHOLSTRLTOT\", \"LDL\", \"HDL\", \"TRIGLYCERIDE\"    No results found for: \"SODIUM\", \"POTASSIUM\", \"CHLORIDE\", \"CO2\", \"GLUCOSE\", \"BUN\", \"CREATININE\", \"BUNCREATRAT\", \"GLOMRATE\"  No results found for: \"ALKPHOSPHAT\", \"ASTSGOT\", \"ALTSGPT\", \"TBILIRUBIN\"   No results found for: \"WBC\", \"HEMOGLOBIN\"  No components found for: \"HBGA1C\"  No components found for: \"TROPONIN\"  No results found for: \"BNP\"    OSH labs 3/4/2020:  Sodium 143  K+ 4.5  Bun 13  Creatinine 1.1  LFTs WNL except albumin 2.6 and TB of 1.1.    HDL 58  LDL 91  tg 78    OSH labs 10/2020:  LDL 78, almost at goal.   tg 73  HDL 67        OSH labs reviewed 3/12/2021:  Alpha-1-antitrypsin serum 183 (ref 101-187)  Sodium 142  Potassium 3.2  Bun 20  Creatinine 1.2  eGFR 58  Co2 25  Cl 105  Calcium 8.7  Urine creatinine 256.7mg/dL  Urine protein 11  Ratio - 0.04  UA dipstick negative for protein.     Stool anti-trypsin negative.     OSH 10/21/2022:  BUN 19  Creatinine 1.1  Sodium 140  Potassium 4.2      OSH labs 2023:  Sodium 140  Potassium 3.3  BUN 22  Creatinine 1.1        Cardiac Imaging and Procedures Review:    EKG dated  : My personal interpretation is NSR, incomplete RBBB, non-specific st changes.     Echo dated 3/2018  Normal.     Exercise stress testing (2018): normal. Treadmill test. Under media tab. 8 minutes on griselda protocol.       Radiology test Review:    Carotid Artery Ultrasound (3/2018):   Mild bilateral plaque.   PFTs 2018: normal.     CAC score of 336 in 2017      Medical Decision Makin. Coronary artery calcification seen on CAT scan  Discussed use of " aspirin for primary prevention and this is an indeterminate range (CAC in the 300s), but given his h/o a fib, I did recommend remaining on baby aspirin and he agreed  -continue aspirin 81mg PO daily  -continue lipitor, zetia  -discussed his SOB could be anginal equivalent, score is likely significantly >300 now given that test was over 6 years ago. Will check exercise nuclear stress test to r/o obstructive disease as the cause.     2. Dyslipidemia  Lipitor. Follow liver function.     3. PAF (paroxysmal atrial fibrillation) (HCC)  Self limited >30 years ago. Asymptomatic off medications. Decision to be off anticoagulation was made long ago.   -CTM, ED precautions discussed. [id number] score of 2.      4. Peripheral arterial disease (HCC)  ABIs in the 80s noted on previous screening LE ultrasound exams.  -aspirin/statin/zetia. Lipid panel with next labs.     5. Taylor-Parkinson-White (WPW) syndrome  Without ablation. Has been asymptomatic for decades and per report had testing that showed his max heart rate was not dangerously high in his SVT  -CTM, ED precautions.    6. Primary central sleep apnea  Mild and not on therapy  -CTM    7. Hypoalbuminemia  Dropping albumin, 2.9 and 2.6 previously. Interestingly he has no renal or GI symptoms and no edema. We have performed a thorough work-up including ruling out urinary and GI losses of albumin/protein.   -continue to monitor, no need for further work-up at this time. Suspect this may be his normal range genetically and this just falls out of the normal parameters for the lab value.   -recheck prn    8,. Elevated blood glucose - follow hgbA1c yearly.     9. Total Elevated bilirubin - has Gilbert's disease. Nothing to do.     Return in about 1 year (around 9/19/2024).    This evaluation was conducted via Zoom using secure and encrypted videoconferencing technology. The patient was in their home in the [facility].    The patient's identity was confirmed and verbal  consent was obtained for this virtual visit.        Rashaun Jenkins MD, [facility], [address]  [address] [address]  Phone: [phone]  Fax: [phone]

## 2023-09-28 ENCOUNTER — TELEPHONE (OUTPATIENT)
Dept: CARDIOLOGY | Facility: MEDICAL CENTER | Age: 73
End: 2023-09-28
Payer: COMMERCIAL

## 2023-09-28 DIAGNOSIS — I20.9 ANGINA PECTORIS (HCC): ICD-10-CM

## 2023-09-28 NOTE — TELEPHONE ENCOUNTER
FAIZA     Caller: Jina at [facility]     DrHalima Office/ Speciality: Dr. Robert Hong radiologist     Calling about: Wants to discuss his findings     Callback Number (Personal numbers in routing comments): [phone]    Thank You,   Jaki BUSTAMANTE

## 2023-09-28 NOTE — TELEPHONE ENCOUNTER
"Called Jina [phone]  Jina transferred call to Dr Hong.  reports concerning findings on NM Stress test.  states \"defect to inferior wall during stress\" and \"tissue at risk\".   Provided fax number [phone] to fax results, cannot fax tracings.   NM stress test results received via fax. Scanned into chart. NM stress test faxed results placed on  desk for review and/or recommendations.         Route to , high priority   "

## 2023-09-29 ENCOUNTER — PATIENT MESSAGE (OUTPATIENT)
Dept: CARDIOLOGY | Facility: MEDICAL CENTER | Age: 73
End: 2023-09-29
Payer: COMMERCIAL

## 2023-09-29 NOTE — TELEPHONE ENCOUNTER
Pt returned call  Relayed stress test results and JM recommendation. Pt would like to talk with interventional provider regarding cath procedure. VV appt scheduled with LYNETTE 10/4/23 11:20am. Pt verbalized understanding and is appreciative of call back.      TRAV Chang

## 2023-09-29 NOTE — TELEPHONE ENCOUNTER
Stress test positive. Given his known significant coronary artery calcium and his symptoms, I would suggest we proceed with cardiac catheterization as we discussed in our appointment. Please let him know. I have ordered this. If he'd like to have a virtual appointment ahead of time to discuss he can with any interventional provider or myself if something opens up. Thank you!

## 2023-09-29 NOTE — TELEPHONE ENCOUNTER
Called pt [phone]  No answer, left VM to call [phone]  Regarding stress test results and JM recommendations

## 2023-10-03 NOTE — TELEPHONE ENCOUNTER
Caller: Carolyn Delaney    Topic/issue: Rhett would like a call back, he has some more questions.     Callback Number: [phone]    Thank you,   Belen CRUZ

## 2023-10-03 NOTE — TELEPHONE ENCOUNTER
Phone number called: [phone]    Call outcome: Spoke to patient and he wanted to ensure that the prior stress test results were sent to the physician he has an appointment with tomorrow (LYNETTE). Results routed to physician. All questions/concerns answered at this time, patient appreciative of information given.

## 2023-10-04 ENCOUNTER — TELEMEDICINE (OUTPATIENT)
Dept: CARDIOLOGY | Facility: MEDICAL CENTER | Age: 73
End: 2023-10-04
Attending: INTERNAL MEDICINE
Payer: MEDICARE

## 2023-10-04 DIAGNOSIS — I25.10 CORONARY ARTERY CALCIFICATION SEEN ON CAT SCAN: ICD-10-CM

## 2023-10-04 DIAGNOSIS — R94.39 ABNORMAL MYOCARDIAL PERFUSION STUDY: ICD-10-CM

## 2023-10-04 PROCEDURE — 99213 OFFICE O/P EST LOW 20 MIN: CPT | Mod: 95 | Performed by: INTERNAL MEDICINE

## 2023-10-04 NOTE — PROGRESS NOTES
Chief Complaint   Patient presents with    Coronary Artery Disease       Subjective     Rhett Barrios is a 73 y.o. male who presents today abnormal myocardial perfusion imaging study.    This evaluation was conducted via Zoom using secure and encrypted videoconferencing technology. The patient was in their home in the [facility].    The patient's identity was confirmed and verbal consent was obtained for this virtual visit.     Since the patient's last visit on 09/19/23 with Rashaun Hines, he underwent an myocardial perfusion imaging study for shortness of breath which was interpreted with inferior ischemia.    Past Medical History:   Diagnosis Date    Back pain     Chickenpox     Coronary artery calcification seen on CAT scan 08/22/2017    Agatston score of 336 in left main, left anterior descending and circumflex predominately left anterior descending with none in the right coronary artery    Cough     Fatigue     GERD (gastroesophageal reflux disease)     [place] measles     Gilbert's syndrome     Hyperlipidemia 2010    Influenza     Kidney stone     Mumps     Nasal drainage     PAF (paroxysmal atrial fibrillation) (HCC)     remote, age 37 without recurrence, WPW found at that time, never ablated. First diagnosed at [place], did stress testing which showed highest rate was not significant and thus they weaned him off medications.     Peripheral arterial disease (HCC) 5/12/2017    Borderline ABIs of 0.8 on the right and 0.9 on the left, 0.9 bilaterally after exercise    Pneumonia     Sputum production     Unspecified sleep apnea 09/11/2012    evaluated by BAKARI Rosales    Taylor-Parkinson-White (WPW) syndrome     Presented with atrial fibrillation and found to have long refractory period Bright bundle at age 37 with negative coronary arteriogram then. 1st evaluated by Dr. Caal in 2010 and Dr. Doan subsequently. Referred for evaluation to Dr. Terrell at [facility], 12/2/2014,  but then decided not  "to go and has had no recurrent atrial fibrillation since age 37 nor any other atrial tachyarrhythmias     Past Surgical History:   Procedure Laterality Date    ARTHROSCOPY, KNEE      KNEE ARTHROSCOPY Right     KNEE ARTHROTOMY Left     VASECTOMY       Family History   Problem Relation Age of Onset    Cancer Mother         Lymphoma    Heart Disease Father 80        CAD and PPM,  at 94     Social History     Socioeconomic History    Marital status:      Spouse name: Not on file    Number of children: Not on file    Years of education: Not on file    Highest education level: Not on file   Occupational History    Not on file   Tobacco Use    Smoking status: Never    Smokeless tobacco: Never   Vaping Use    Vaping Use: Never used   Substance and Sexual Activity    Alcohol use: Yes     Comment: Very rarely    Drug use: No    Sexual activity: Not on file   Other Topics Concern    Not on file   Social History Narrative         Social Determinants of Health     Financial Resource Strain: Not on file   Food Insecurity: Not on file   Transportation Needs: Not on file   Physical Activity: Not on file   Stress: Not on file   Social Connections: Not on file   Intimate Partner Violence: Not on file   Housing Stability: Not on file     Allergies   Allergen Reactions    Digoxin Unspecified     \"Patient has WPW and was informed to not take this medicine.\"    Norvasc [Amlodipine]      Leg swelling     Outpatient Encounter Medications as of 10/4/2023   Medication Sig Dispense Refill    Magnesium 100 MG Cap Take  by mouth.      tadalafil (CIALIS) 5 MG tablet Take 5 mg by mouth every day.      ezetimibe (ZETIA) 10 MG Tab Take 1 Tablet by mouth every day. 100 Tablet 3    chlorthalidone (HYGROTON) 25 MG Tab Take 1 Tablet by mouth every day. 100 Tablet 3    atorvastatin (LIPITOR) 80 MG tablet Take 1 Tablet by mouth every day. 100 Tablet 3    potassium chloride SA (KDUR) 20 MEQ Tab CR Take 2 Tablets by mouth every day. 180 " Tablet 2    famotidine (PEPCID) 40 MG Tab Take 40 mg by mouth 2 times a day.      omeprazole (PRILOSEC) 40 MG delayed-release capsule Take 40 mg by mouth every day.      aspirin EC (ECOTRIN) 81 MG TBEC Take 81 mg by mouth every day.      Coenzyme Q10 (CO Q 10) 100 MG CAPS Take 100 mg by mouth every day.      vitamin D (CHOLECALCIFEROL) 1000 UNIT TABS Take 1,000 Units by mouth every day. (Patient not taking: Reported on 10/4/2023)       No facility-administered encounter medications on file as of 10/4/2023.     Review of Systems   Unable to perform ROS: Other              Objective     There were no vitals taken for this visit.    Physical Exam  Constitutional:       Appearance: He is well-developed.   HENT:      Head: Normocephalic and atraumatic.   Eyes:      Conjunctiva/sclera: Conjunctivae normal.   Musculoskeletal:         General: Normal range of motion.      Cervical back: Normal range of motion.   Neurological:      Mental Status: He is alert and oriented to person, place, and time.            CARDIAC STUDIES/PROCEDURES:    MYOCARDIAL PERFUSION STUDY CONCLUSIONS at [facility] (09/18/23)  There is stress-induced ischemia in the inferior wall.  LVEF =74% on rest and 69% on stress.  (study result reviewed)     MYOCARDIAL PERFUSION STUDY CONCLUSIONS at [facility] (05/09/18)  No stress induced ischemia or myocardial scar identified.  Left ventricular ejection fraction measures 67%.  (study result reviewed)     Assessment & Plan     1. Abnormal myocardial perfusion study        2. Coronary artery calcification seen on CAT scan            Medical Decision Making: Today's Assessment/Status/Plan:        Abnormal myocardial perfusion imaging study: He underwent myocardial perfusion imaging study with results as described and was recommended cardiac catheterization by Rashaun Hines which he does not wish to pursue. He will follow up with Rashaun Hines to discuss further  options.                   stretcher

## 2023-10-05 ENCOUNTER — TELEPHONE (OUTPATIENT)
Dept: CARDIOLOGY | Facility: MEDICAL CENTER | Age: 73
End: 2023-10-05
Payer: COMMERCIAL

## 2023-10-05 NOTE — TELEPHONE ENCOUNTER
LYNETTE Slaterer: Rhett Barrios     Topic/issue: Patient states that he spoke to Dr. Hackett yesterday and he is not happy with the conversation that was had.  He would like to speak to Dr Jenkins directly regarding his stress test results.     Callback Number: [phone]    Thank You   Jaki BUSTAMANTE

## 2023-10-06 ENCOUNTER — TELEPHONE (OUTPATIENT)
Dept: CARDIOLOGY | Facility: MEDICAL CENTER | Age: 73
End: 2023-10-06
Payer: COMMERCIAL

## 2023-10-06 NOTE — PATIENT COMMUNICATION
To schedulers please schedule pt FV with JM per pt request.  Thank you!    To [facility], please review pt response regarding last OV and follow up, thank you!

## 2023-10-09 ENCOUNTER — APPOINTMENT (OUTPATIENT)
Dept: ADMISSIONS | Facility: MEDICAL CENTER | Age: 73
End: 2023-10-09
Attending: INTERNAL MEDICINE
Payer: MEDICARE

## 2023-10-10 ENCOUNTER — TELEMEDICINE (OUTPATIENT)
Dept: CARDIOLOGY | Facility: MEDICAL CENTER | Age: 73
End: 2023-10-10
Attending: INTERNAL MEDICINE
Payer: MEDICARE

## 2023-10-10 VITALS
DIASTOLIC BLOOD PRESSURE: 70 MMHG | BODY MASS INDEX: 29.47 KG/M2 | HEIGHT: 76 IN | HEART RATE: 66 BPM | WEIGHT: 242 LBS | SYSTOLIC BLOOD PRESSURE: 130 MMHG

## 2023-10-10 DIAGNOSIS — I45.6 WOLFF-PARKINSON-WHITE (WPW) SYNDROME: ICD-10-CM

## 2023-10-10 DIAGNOSIS — I25.10 CORONARY ARTERY CALCIFICATION SEEN ON CAT SCAN: ICD-10-CM

## 2023-10-10 DIAGNOSIS — R94.39 ABNORMAL MYOCARDIAL PERFUSION STUDY: ICD-10-CM

## 2023-10-10 DIAGNOSIS — E78.5 DYSLIPIDEMIA: Chronic | ICD-10-CM

## 2023-10-10 DIAGNOSIS — I48.0 PAF (PAROXYSMAL ATRIAL FIBRILLATION) (HCC): ICD-10-CM

## 2023-10-10 PROCEDURE — 99214 OFFICE O/P EST MOD 30 MIN: CPT | Mod: 95 | Performed by: INTERNAL MEDICINE

## 2023-10-10 NOTE — PROGRESS NOTES
Cardiology Virtual Follow-up Consultation Note    Date of note:  10/10/2023  Primary Care Provider: Gene Galvan D.O.  Referring Provider: Ab Paz M.D.     Patient Name: hRett Barrios   YOB: 1950  MRN:              [id number]    Chief Complaint: SOB    History of Present Illness: Rhett Barrios is a 73 y.o. male whose current medical problems include hypertension, WPW, paroxysmal atrial fibrillation, CAC score of 336 in 2017, dyslipidemia, obesity, and PVD who I am seeing for follow-up.     At our visit,  3/12/2020:  Blood pressure at home in the 130s.     At our visit, 4/16/2021:  Hypertension well controlled.     In terms of his PVD, does have claudication at the end of when he's walking up hills.     At our visit, 10/21/2021: no updates     At our visit, 4/19/2022:  Occasional SOB when going up stairs, has had some weight gain.     Does walk his dog daily, 20 minutes, asymptomatic. Skiing without a problem.      At our visit, 1/19/2023:  In terms of a fib, no palpitations, not on anticoagulation.     In [place] they have 15 feet of snow so far this month. Shoveling snow and skiing without symptoms.     Has had two UTIs. Gonna f/u with urology.     At our visit, 9/19/2023:  Potassium 3.3, improved on higher potassium.     Started getting cramps, started taking a supplement and improved.     No palpitations.     In terms of his CAC, no angina.     Walking the dog and skiing for exercise.     Progressive shortness of breath going up and down his stairs despite maintaining his exercise regimen.     Interim Events:  At 37 had a false positive in the inferior wall.     2018 stress test, no WMA, ischemia.     Recent stress test with ischemic in inferior wall again, worried this is again going to be a false positive.     ROS  No fevers, CVA symptoms, bleeding issues.           Past Medical History:   Diagnosis Date    Back pain     Chickenpox     Coronary artery calcification seen on CAT  scan 08/22/2017    Agatston score of 336 in left main, left anterior descending and circumflex predominately left anterior descending with none in the right coronary artery    Cough     Fatigue     GERD (gastroesophageal reflux disease)     Armenian measles     Gilbert's syndrome     Hyperlipidemia 2010    Influenza     Kidney stone     Mumps     Nasal drainage     PAF (paroxysmal atrial fibrillation) (HCC)     remote, age 37 without recurrence, WPW found at that time, never ablated. First diagnosed at [place], did stress testing which showed highest rate was not significant and thus they weaned him off medications.     Peripheral arterial disease (HCC) 5/12/2017    Borderline ABIs of 0.8 on the right and 0.9 on the left, 0.9 bilaterally after exercise    Pneumonia     Sputum production     Unspecified sleep apnea 09/11/2012    evaluated by Dr. Gutierrez, [facility]    Taylor-Parkinson-White (WPW) syndrome     Presented with atrial fibrillation and found to have long refractory period Bright bundle at age 37 with negative coronary arteriogram then. 1st evaluated by Dr. Caal in 2010 and Dr. Doan subsequently. Referred for evaluation to Dr. Terrell at [facility], 12/2/2014,  but then decided not to go and has had no recurrent atrial fibrillation since age 37 nor any other atrial tachyarrhythmias         Past Surgical History:   Procedure Laterality Date    ARTHROSCOPY, KNEE      KNEE ARTHROSCOPY Right     KNEE ARTHROTOMY Left     VASECTOMY           Current Outpatient Medications   Medication Sig Dispense Refill    Magnesium 100 MG Cap Take  by mouth.      tadalafil (CIALIS) 5 MG tablet Take 5 mg by mouth every day.      ezetimibe (ZETIA) 10 MG Tab Take 1 Tablet by mouth every day. 100 Tablet 3    chlorthalidone (HYGROTON) 25 MG Tab Take 1 Tablet by mouth every day. 100 Tablet 3    atorvastatin (LIPITOR) 80 MG tablet Take 1 Tablet by mouth every day. 100 Tablet 3    potassium chloride SA (KDUR) 20 MEQ Tab CR Take 2 Tablets by  "mouth every day. 180 Tablet 2    famotidine (PEPCID) 40 MG Tab Take 40 mg by mouth 2 times a day.      omeprazole (PRILOSEC) 40 MG delayed-release capsule Take 40 mg by mouth every day.      aspirin EC (ECOTRIN) 81 MG TBEC Take 81 mg by mouth every day.      Coenzyme Q10 (CO Q 10) 100 MG CAPS Take 100 mg by mouth every day.      vitamin D (CHOLECALCIFEROL) 1000 UNIT TABS Take 1,000 Units by mouth every day.       No current facility-administered medications for this visit.         Allergies   Allergen Reactions    Digoxin Unspecified     \"Patient has WPW and was informed to not take this medicine.\"    Norvasc [Amlodipine]      Leg swelling         Family History   Problem Relation Age of Onset    Cancer Mother         Lymphoma    Heart Disease Father 80        CAD and PPM,  at 94         Social History     Socioeconomic History    Marital status:      Spouse name: Not on file    Number of children: Not on file    Years of education: Not on file    Highest education level: Not on file   Occupational History    Not on file   Tobacco Use    Smoking status: Never    Smokeless tobacco: Never   Vaping Use    Vaping Use: Never used   Substance and Sexual Activity    Alcohol use: Yes     Comment: Very rarely    Drug use: No    Sexual activity: Not on file   Other Topics Concern    Not on file   Social History Narrative         Social Determinants of Health     Financial Resource Strain: Not on file   Food Insecurity: Not on file   Transportation Needs: Not on file   Physical Activity: Not on file   Stress: Not on file   Social Connections: Not on file   Intimate Partner Violence: Not on file   Housing Stability: Not on file         Physical Exam:  Ambulatory Vitals  /70 (BP Location: Left arm, Patient Position: Sitting, BP Cuff Size: Adult)   Pulse 66   Ht 1.93 m (6' 4\")   Wt 110 kg (242 lb)    Oxygen Therapy:     BP Readings from Last 4 Encounters:   10/10/23 130/70   23 128/77   23 " "125/75   04/19/22 126/83       Weight/BMI: Body mass index is 29.46 kg/m².  Wt Readings from Last 4 Encounters:   10/10/23 110 kg (242 lb)   09/19/23 110 kg (242 lb)   01/19/23 113 kg (250 lb)   04/19/22 111 kg (245 lb)       General: No apparent distress  Eyes: nl conjunctiva  Neck: JVP appeared normal from afar  Lungs: normal respiratory effort  Neurological: Moving upper extremities.   Psychiatric: Appropriate affect, A/O x 3, intact judgement and insight  Skin: no visible rashes      Lab Data Review:  No results found for: \"CHOLSTRLTOT\", \"LDL\", \"HDL\", \"TRIGLYCERIDE\"    No results found for: \"SODIUM\", \"POTASSIUM\", \"CHLORIDE\", \"CO2\", \"GLUCOSE\", \"BUN\", \"CREATININE\", \"BUNCREATRAT\", \"GLOMRATE\"  No results found for: \"ALKPHOSPHAT\", \"ASTSGOT\", \"ALTSGPT\", \"TBILIRUBIN\"   No results found for: \"WBC\", \"HEMOGLOBIN\"  No components found for: \"HBGA1C\"  No components found for: \"TROPONIN\"  No results found for: \"BNP\"    OSH labs 3/4/2020:  Sodium 143  K+ 4.5  Bun 13  Creatinine 1.1  LFTs WNL except albumin 2.6 and TB of 1.1.    HDL 58  LDL 91  tg 78    OSH labs 10/2020:  LDL 78, almost at goal.   tg 73  HDL 67        OSH labs reviewed 3/12/2021:  Alpha-1-antitrypsin serum 183 (ref 101-187)  Sodium 142  Potassium 3.2  Bun 20  Creatinine 1.2  eGFR 58  Co2 25  Cl 105  Calcium 8.7  Urine creatinine 256.7mg/dL  Urine protein 11  Ratio - 0.04  UA dipstick negative for protein.     Stool anti-trypsin negative.     OSH 10/21/2022:  BUN 19  Creatinine 1.1  Sodium 140  Potassium 4.2      OSH labs 6/2/2023:  Sodium 140  Potassium 3.3  BUN 22  Creatinine 1.1        Cardiac Imaging and Procedures Review:    EKG dated 92016 : My personal interpretation is NSR, incomplete RBBB, non-specific st changes.     Echo dated 3/2018  Normal.     Exercise stress testing (5/2018): normal. Treadmill test. Under media tab. 8 minutes on griselda protocol.       Radiology test Review:    Carotid Artery Ultrasound (3/2018):   Mild bilateral plaque. "   PFTs 2018: normal.     CAC score of 336 in 2017      Medical Decision Makin. Coronary artery calcification seen on CAT scan with positive stress test.   Discussed use of aspirin for primary prevention and this is an indeterminate range (CAC in the 300s), but given his h/o a fib, I did recommend remaining on baby aspirin and he agreed  -continue aspirin 81mg PO daily  -continue lipitor, zetia  -we had a long discussion with him and his wife about the [place] procedure and his stress test findings in the setting of his previously normal stress test and significant coronary artery calcium. Based on the risks and benefits we opted to proceed with cardiac catheterization.     2. Dyslipidemia  Lipitor. Follow liver function.     3. PAF (paroxysmal atrial fibrillation) (HCC)  Self limited >30 years ago. Asymptomatic off medications. Decision to be off anticoagulation was made long ago.   -CTM, ED precautions discussed. [id number] score of 2.      4. Peripheral arterial disease (HCC)  ABIs in the 80s noted on previous screening LE ultrasound exams.  -aspirin/statin/zetia. Lipid panel with next labs.     5. Taylor-Parkinson-White (WPW) syndrome  Without ablation. Has been asymptomatic for decades and per report had testing that showed his max heart rate was not dangerously high in his SVT  -CTM, ED precautions.    6. Primary central sleep apnea  Mild and not on therapy  -CTM    7. Hypoalbuminemia  Dropping albumin, 2.9 and 2.6 previously. Interestingly he has no renal or GI symptoms and no edema. We have performed a thorough work-up including ruling out urinary and GI losses of albumin/protein.   -continue to monitor, no need for further work-up at this time. Suspect this may be his normal range genetically and this just falls out of the normal parameters for the lab value.   -recheck prn    8,. Elevated blood glucose - follow hgbA1c yearly.     9. Total Elevated bilirubin - has Gilbert's disease. Nothing to do.      Return in about 6 months (around 4/10/2024).    This evaluation was conducted via Zoom using secure and encrypted videoconferencing technology. The patient was in their home in the [facility] .    The patient's identity was confirmed and verbal consent was obtained for this virtual visit.          Rashaun Jenkins MD, [facility] Heart and Vascular [facility], [address]ld[address].  [address] [place] [address]  Phone: [phone]  Fax: [phone]

## 2023-10-12 ENCOUNTER — PRE-ADMISSION TESTING (OUTPATIENT)
Dept: ADMISSIONS | Facility: MEDICAL CENTER | Age: 73
End: 2023-10-12
Attending: INTERNAL MEDICINE
Payer: MEDICARE

## 2023-10-18 ENCOUNTER — HOSPITAL ENCOUNTER (OUTPATIENT)
Facility: MEDICAL CENTER | Age: 73
End: 2023-10-18
Attending: INTERNAL MEDICINE | Admitting: INTERNAL MEDICINE
Payer: MEDICARE

## 2023-10-18 ENCOUNTER — APPOINTMENT (OUTPATIENT)
Dept: CARDIOLOGY | Facility: MEDICAL CENTER | Age: 73
End: 2023-10-18
Attending: INTERNAL MEDICINE
Payer: MEDICARE

## 2023-10-18 VITALS
BODY MASS INDEX: 30.5 KG/M2 | TEMPERATURE: 97.6 F | DIASTOLIC BLOOD PRESSURE: 76 MMHG | RESPIRATION RATE: 16 BRPM | HEIGHT: 76 IN | WEIGHT: 250.44 LBS | SYSTOLIC BLOOD PRESSURE: 148 MMHG | OXYGEN SATURATION: 93 % | HEART RATE: 53 BPM

## 2023-10-18 DIAGNOSIS — I20.9 ANGINA PECTORIS (HCC): ICD-10-CM

## 2023-10-18 LAB
ALBUMIN SERPL BCP-MCNC: 2.8 G/DL (ref 3.2–4.9)
ALBUMIN/GLOB SERPL: 0.9 G/DL
ALP SERPL-CCNC: 107 U/L (ref 30–99)
ALT SERPL-CCNC: 25 U/L (ref 2–50)
ANION GAP SERPL CALC-SCNC: 10 MMOL/L (ref 7–16)
APTT PPP: 26.2 SEC (ref 24.7–36)
AST SERPL-CCNC: 20 U/L (ref 12–45)
BILIRUB SERPL-MCNC: 1.5 MG/DL (ref 0.1–1.5)
BUN SERPL-MCNC: 18 MG/DL (ref 8–22)
CALCIUM ALBUM COR SERPL-MCNC: 9.6 MG/DL (ref 8.5–10.5)
CALCIUM SERPL-MCNC: 8.6 MG/DL (ref 8.5–10.5)
CHLORIDE SERPL-SCNC: 105 MMOL/L (ref 96–112)
CO2 SERPL-SCNC: 26 MMOL/L (ref 20–33)
CREAT SERPL-MCNC: 0.9 MG/DL (ref 0.5–1.4)
EKG IMPRESSION: NORMAL
ERYTHROCYTE [DISTWIDTH] IN BLOOD BY AUTOMATED COUNT: 41.9 FL (ref 35.9–50)
GFR SERPLBLD CREATININE-BSD FMLA CKD-EPI: 91 ML/MIN/1.73 M 2
GLOBULIN SER CALC-MCNC: 3.1 G/DL (ref 1.9–3.5)
GLUCOSE SERPL-MCNC: 117 MG/DL (ref 65–99)
HCT VFR BLD AUTO: 45.1 % (ref 42–52)
HGB BLD-MCNC: 16 G/DL (ref 14–18)
INR PPP: 1.04 (ref 0.87–1.13)
MCH RBC QN AUTO: 31.8 PG (ref 27–33)
MCHC RBC AUTO-ENTMCNC: 35.5 G/DL (ref 32.3–36.5)
MCV RBC AUTO: 89.7 FL (ref 81.4–97.8)
PLATELET # BLD AUTO: 198 K/UL (ref 164–446)
PMV BLD AUTO: 10.5 FL (ref 9–12.9)
POTASSIUM SERPL-SCNC: 3.4 MMOL/L (ref 3.6–5.5)
PROT SERPL-MCNC: 5.9 G/DL (ref 6–8.2)
PROTHROMBIN TIME: 13.8 SEC (ref 12–14.6)
RBC # BLD AUTO: 5.03 M/UL (ref 4.7–6.1)
SODIUM SERPL-SCNC: 141 MMOL/L (ref 135–145)
WBC # BLD AUTO: 7.1 K/UL (ref 4.8–10.8)

## 2023-10-18 PROCEDURE — 160036 HCHG PACU - EA ADDL 30 MINS PHASE I

## 2023-10-18 PROCEDURE — 85610 PROTHROMBIN TIME: CPT

## 2023-10-18 PROCEDURE — C1769 GUIDE WIRE: HCPCS

## 2023-10-18 PROCEDURE — 85027 COMPLETE CBC AUTOMATED: CPT

## 2023-10-18 PROCEDURE — 80053 COMPREHEN METABOLIC PANEL: CPT

## 2023-10-18 PROCEDURE — 85730 THROMBOPLASTIN TIME PARTIAL: CPT

## 2023-10-18 PROCEDURE — 700101 HCHG RX REV CODE 250

## 2023-10-18 PROCEDURE — 700102 HCHG RX REV CODE 250 W/ 637 OVERRIDE(OP)

## 2023-10-18 PROCEDURE — 93005 ELECTROCARDIOGRAM TRACING: CPT | Performed by: INTERNAL MEDICINE

## 2023-10-18 PROCEDURE — 700111 HCHG RX REV CODE 636 W/ 250 OVERRIDE (IP)

## 2023-10-18 PROCEDURE — 700117 HCHG RX CONTRAST REV CODE 255: Performed by: INTERNAL MEDICINE

## 2023-10-18 PROCEDURE — 93458 L HRT ARTERY/VENTRICLE ANGIO: CPT | Mod: 26 | Performed by: INTERNAL MEDICINE

## 2023-10-18 PROCEDURE — A9270 NON-COVERED ITEM OR SERVICE: HCPCS

## 2023-10-18 PROCEDURE — 160035 HCHG PACU - 1ST 60 MINS PHASE I

## 2023-10-18 PROCEDURE — 93010 ELECTROCARDIOGRAM REPORT: CPT | Performed by: INTERNAL MEDICINE

## 2023-10-18 PROCEDURE — 99152 MOD SED SAME PHYS/QHP 5/>YRS: CPT | Performed by: INTERNAL MEDICINE

## 2023-10-18 PROCEDURE — 160002 HCHG RECOVERY MINUTES (STAT)

## 2023-10-18 PROCEDURE — 700105 HCHG RX REV CODE 258: Performed by: INTERNAL MEDICINE

## 2023-10-18 RX ORDER — LIDOCAINE HYDROCHLORIDE 20 MG/ML
INJECTION, SOLUTION INFILTRATION; PERINEURAL
Status: COMPLETED
Start: 2023-10-18 | End: 2023-10-18

## 2023-10-18 RX ORDER — HEPARIN SODIUM 1000 [USP'U]/ML
INJECTION, SOLUTION INTRAVENOUS; SUBCUTANEOUS
Status: COMPLETED
Start: 2023-10-18 | End: 2023-10-18

## 2023-10-18 RX ORDER — SODIUM CHLORIDE 9 MG/ML
5 INJECTION, SOLUTION INTRAVENOUS CONTINUOUS
Status: DISCONTINUED | OUTPATIENT
Start: 2023-10-18 | End: 2023-10-18 | Stop reason: HOSPADM

## 2023-10-18 RX ORDER — HEPARIN SODIUM 200 [USP'U]/100ML
INJECTION, SOLUTION INTRAVENOUS
Status: COMPLETED
Start: 2023-10-18 | End: 2023-10-18

## 2023-10-18 RX ORDER — SODIUM CHLORIDE 9 MG/ML
1000 INJECTION, SOLUTION INTRAVENOUS CONTINUOUS
Status: DISCONTINUED | OUTPATIENT
Start: 2023-10-18 | End: 2023-10-18

## 2023-10-18 RX ORDER — ASPIRIN 81 MG/1
TABLET, CHEWABLE ORAL
Status: COMPLETED
Start: 2023-10-18 | End: 2023-10-18

## 2023-10-18 RX ORDER — VERAPAMIL HYDROCHLORIDE 2.5 MG/ML
INJECTION, SOLUTION INTRAVENOUS
Status: COMPLETED
Start: 2023-10-18 | End: 2023-10-18

## 2023-10-18 RX ORDER — MIDAZOLAM HYDROCHLORIDE 1 MG/ML
INJECTION INTRAMUSCULAR; INTRAVENOUS
Status: COMPLETED
Start: 2023-10-18 | End: 2023-10-18

## 2023-10-18 RX ADMIN — MIDAZOLAM 2 MG: 1 INJECTION, SOLUTION INTRAMUSCULAR; INTRAVENOUS at 11:52

## 2023-10-18 RX ADMIN — IOHEXOL 35 ML: 350 INJECTION, SOLUTION INTRAVENOUS at 12:00

## 2023-10-18 RX ADMIN — HEPARIN SODIUM 2000 UNITS: 200 INJECTION, SOLUTION INTRAVENOUS at 11:10

## 2023-10-18 RX ADMIN — ASPIRIN 81 MG 81 MG: 81 TABLET ORAL at 11:11

## 2023-10-18 RX ADMIN — HEPARIN SODIUM: 1000 INJECTION, SOLUTION INTRAVENOUS; SUBCUTANEOUS at 11:10

## 2023-10-18 RX ADMIN — NITROGLYCERIN 10 ML: 20 INJECTION INTRAVENOUS at 11:10

## 2023-10-18 RX ADMIN — VERAPAMIL HYDROCHLORIDE 5 MG: 2.5 INJECTION, SOLUTION INTRAVENOUS at 11:10

## 2023-10-18 RX ADMIN — FENTANYL CITRATE 100 MCG: 50 INJECTION, SOLUTION INTRAMUSCULAR; INTRAVENOUS at 11:52

## 2023-10-18 RX ADMIN — SODIUM CHLORIDE 1000 ML: 9 INJECTION, SOLUTION INTRAVENOUS at 08:59

## 2023-10-18 RX ADMIN — LIDOCAINE HYDROCHLORIDE: 20 INJECTION, SOLUTION INFILTRATION; PERINEURAL at 11:10

## 2023-10-18 ASSESSMENT — PAIN DESCRIPTION - PAIN TYPE
TYPE: SURGICAL PAIN

## 2023-10-18 NOTE — PROCEDURES
Cardiac Catheterization Laboratory Procedure Note    DATE: 10/18/2023    : Chacho Begum MD    PROCEDURES PERFORMED:  Left heart catheterization  Coronary angiography  Moderate conscious sedation    INDICATIONS:  The patient is a 73-year-old gentleman referred for cardiac catheterization to evaluate dyspnea on exertion with an abnormal cardiac stress test in the setting of a known abnormal coronary artery calcium score.    CONSENT:  The complete alternatives, risks, and benefits of the procedure were explained to the patient.  Signed informed consent was obtained and placed in the chart prior to the procedure.  A timeout was performed prior to beginning the procedure.    MEDICATIONS:  Lidocaine  Fentanyl  Midazolam  Nitroglycerin  Verapamil  Heparin    MODERATE CONSCIOUS SEDATION:  I personally supervised the administration of moderate conscious sedation by the nursing staff for 12 minutes.  Sedation start time: 11:40 AM  Sedation end time: 11:52 AM    CONTRAST: Omnipaque 35 cc    ACCESS: 6-Zimbabwean Glidesheath in the right radial artery.    ESTIMATED BLOOD LOSS: 10 cc    COMPLICATIONS: None    DESCRIPTION OF PROCEDURE:  The patient was brought to the cardiac catheterization laboratory in the fasting state.  The skin over the right wrist was prepped and draped in the usual sterile fashion.  Lidocaine infiltration was used to anesthetize the tissue over the right radial artery.  Using the micropuncture technique, a 6-Zimbabwean Glidesheath was inserted in the right radial artery.  A 5-Zimbabwean SuperDimension catheter was then advanced over a standard J-wire into the left ventricular cavity where it was gently aspirated, flushed, and then withdrawn across the aortic valve with sequential pressures measured.  This catheter was then used to engage the ostium of the right coronary artery and cineangiograms were obtained in multiple projections for complete evaluation of the right coronary system.  This catheter was then used to  engage the ostium of the left main coronary artery and cineangiograms were obtained in multiple projections for complete evaluation of the left coronary system.  At the completion of the case all wires, catheters, and sheaths were removed.  A TR band was placed using the patent hemostasis technique.    HEMODYNAMICS:   Aortic pressure: 108/57 mmHg  Pre-A wave pressure: 8  No significant aortic gradient on pullback    CORONARY ANGIOGRAPHY:  The left main coronary artery is patent and trifurcates into the left anterior descending, ramus intermedius, and left circumflex coronary arteries.  The left anterior descending coronary artery is a large, transapical vessel with proximal 20% disease, mid 40% disease, and distal luminal irregularities.  It supplies a moderate first diagonal branch with ostial 50% disease followed by luminal irregularities.  The ramus intermedius is a small, insignificant vessel.  The left circumflex coronary artery is a moderate, nondominant vessel with mid 40% disease and otherwise luminal irregularities.  It supplies moderate first and second diagonal branches with luminal irregularities.  The right coronary artery is a large, dominant vessel with scattered 10-20% lesions throughout its course.  Distally, it bifurcates into a large posterolateral branch and a moderate posterior descending coronary with luminal irregularities.    IMPRESSION:  Mild nonobstructive coronary artery disease.  Normal left heart filling pressures.    RECOMMENDATIONS:  Recover in post procedure unit.  TR band release per protocol.  Post procedure fluids per protocol.  Continue aggressive cardiac risk factor management.    NOTIFICATION:  The patient's family was notified of the results of his cardiac catheterization.

## 2023-10-18 NOTE — H&P
CARDIOLOGY PRE-PROCEDURE HISTORY AND PHYSICAL      Date of Procedure: 10/18/2023    Patient Name: Rhett Barrios  YOB: 1950  MRN: [id number]    Planned Procedure:   Coronary angiography with possible PCI    History of Present Illness:   Rhett Barrios is a 73 year-old gentleman referred for cardiac catheterization with possible PCI to evaluate dyspnea on exertion with an abnormal cardiac stress test in the setting of a known history of prior false positive stress test approximately 35 years ago.  Today, he reports feeling well today and specifically denies any acute chest pain, shortness of breath, lower extremity swelling, or lightheadedness.  We specifically discussed that his stress test was suggestive of possible single-vessel disease and that, in most cases, intervention on single-vessel disease is primarily for management of symptoms and not prolongation of life or reduction in major heart attack risk.  Following this discussion, he was in favor of proceeding with PCI if there was a severe lesion suspected to be causing his symptoms rather than additional medical therapy.    Medical History:     Past Medical History:   Diagnosis Date    Back pain     Chickenpox     Coronary artery calcification seen on CAT scan 08/22/2017    Agatston score of 336 in left main, left anterior descending and circumflex predominately left anterior descending with none in the right coronary artery    Cough     Fatigue     GERD (gastroesophageal reflux disease)     Nepali measles     Gilbert's syndrome     Hyperlipidemia 2010    Influenza     Kidney stone     Mumps     Nasal drainage     PAF (paroxysmal atrial fibrillation) ([facility])     remote, age 37 without recurrence, WPW found at that time, never ablated. First diagnosed at [place], did stress testing which showed highest rate was not significant and thus they weaned him off medications.     Peripheral arterial disease (HCC) 5/12/2017    Borderline  "ABIs of 0.8 on the right and 0.9 on the left, 0.9 bilaterally after exercise    Pneumonia     Sputum production     Unspecified sleep apnea 2012    evaluated by Dr. Gutierrez, [facility]    Taylor-Parkinson-White (WPW) syndrome     Presented with atrial fibrillation and found to have long refractory period Bright bundle at age 37 with negative coronary arteriogram then. 1st evaluated by Dr. Caal in  and Dr. Doan subsequently. Referred for evaluation to Dr. Terrell at [facility], 2014,  but then decided not to go and has had no recurrent atrial fibrillation since age 37 nor any other atrial tachyarrhythmias     Surgical History:     Past Surgical History:   Procedure Laterality Date    ARTHROSCOPY, KNEE      KNEE ARTHROSCOPY Right     KNEE ARTHROTOMY Left     VASECTOMY       Family History:     Family History   Problem Relation Age of Onset    Cancer Mother         Lymphoma    Heart Disease Father 80        CAD and PPM,  at 94     Social History:   Reviewed, noncontributory to current procedure.    Medications and Allergies:     Current Facility-Administered Medications   Medication Dose Route Frequency Provider Last Rate Last Admin    NS infusion 1,000 mL  1,000 mL Intravenous Continuous Chacho Begum M.D. 100 mL/hr at 10/18/23 0859 1,000 mL at 10/18/23 0859    FENTANYL CITRATE (PF) 0.05 MG/ML INJ SOLN (WRAPPED)             MIDAZOLAM HCL 2 MG/2ML INJ SOLN (WRAPPER)                Allergies   Allergen Reactions    Digoxin Unspecified     \"Patient has WPW and was informed to not take this medicine.\"    Norvasc [Amlodipine]      Leg swelling     Review of Systems:   A relevant review of systems for the planned procedure was performed.    Vital Signs:   /72   Pulse (!) 52   Temp 36.4 °C (97.6 °F) (Temporal)   Resp 16   Ht 1.93 m (6' 4\")   Wt 114 kg (250 lb 7.1 oz)   SpO2 95%   BMI 30.48 kg/m²   Vitals:    10/18/23 0806 10/18/23 0852   BP:  126/72   Pulse:  (!) 52   Resp:  16   Temp:  36.4 °C (97.6 " "°F)   TempSrc:  Temporal   SpO2:  95%   Weight: 110 kg (242 lb 8.1 oz) 114 kg (250 lb 7.1 oz)   Height:  1.93 m (6' 4\")     Body mass index is 30.48 kg/m².  Oxygen Therapy:  Pulse Oximetry: 95 %, O2 (LPM): 0, O2 Delivery Device: None - Room Air    Physical Examination:   General: Well-appearing, no acute distress  Eyes: Extraocular movements intact, anicteric  HEENT: Neck full range of motion, no jugular venous distension  Pulmonary: Normal respiratory effort, clear to auscultation bilaterally  Cardiovascular: Regular rate and rhythm, no murmurs or gallops appreciated  Gastrointestinal: Soft, non-tender, non-distended  Extremities: Warm and well perfused, no lower extremity edema  Neurological: Alert and oriented, no gross focal motor deficits  Psychiatric: Normal affect, normal judgment    Laboratories:   Estimated Creatinine Clearance: 104 mL/min (by C-G formula based on SCr of 0.9 mg/dL).  Recent Labs     10/18/23  0836   CREATININE 0.90   BUN 18   POTASSIUM 3.4*   SODIUM 141   CALCIUM 8.6   CO2 26   ALBUMIN 2.8*     Recent Labs     10/18/23  0836   GLUCOSE 117*     Recent Labs     10/18/23  0836   ASTSGOT 20   ALTSGPT 25   ALKPHOSPHAT 107*   INR 1.04     Recent Labs     10/18/23  0836   WBC 7.1   HEMOGLOBIN 16.0   PLATELETCT 198     No results for input(s): \"TROPONINT\", \"NTPROBNP\", \"HBA1C\" in the last 72 hours.  No results found for: \"LDL\"  No results found for: \"HDL\"    No results found for: \"TRIGLYCERIDE\"    No results found for: \"CHOLSTRLTOT\"    Studies:   The patient's most recent cardiac studies including ECG, echocardiography, stress test, and cardiac catheterization were reviewed.    Assessment and Recommendations:   The patient was assessed prior to the procedure.  The complete alternatives, risks, and benefits of the procedure were discussed with the patient who consented to proceed.  The Patient specifically denied recent serious bleeding episodes or upcoming surgeries/procedures that cannot be delayed " or performed on dual antiplatelet therapy.  We will proceed with planned procedure.        Chacho Begum MD, [facility]  Interventional Cardiology  [facility] Heart and Vascular [facility], Bldg [address]  [address] [place] [id number]  Phone: [phone]  Fax: [phone]

## 2023-10-18 NOTE — OR NURSING
Arrived to PPU in stable condition. Site to RRA, CDI with TR band in place. Denies pain or nausea and taking po well. Family updated and belonging on bed.         TR band to RRA, air release Q15 min until decompressed. No bleeding or swelling to site. Dry gauze and transparent placed to site. Patient instructed on limitations to wrist movement and site care

## 2024-01-30 ENCOUNTER — TELEMEDICINE (OUTPATIENT)
Dept: CARDIOLOGY | Facility: MEDICAL CENTER | Age: 74
End: 2024-01-30
Attending: INTERNAL MEDICINE
Payer: MEDICARE

## 2024-01-30 VITALS
SYSTOLIC BLOOD PRESSURE: 128 MMHG | HEART RATE: 89 BPM | DIASTOLIC BLOOD PRESSURE: 80 MMHG | HEIGHT: 76 IN | WEIGHT: 248 LBS | BODY MASS INDEX: 30.2 KG/M2

## 2024-01-30 DIAGNOSIS — I25.84 CORONARY ARTERY DISEASE DUE TO CALCIFIED CORONARY LESION: ICD-10-CM

## 2024-01-30 DIAGNOSIS — I25.10 CORONARY ARTERY DISEASE DUE TO CALCIFIED CORONARY LESION: ICD-10-CM

## 2024-01-30 DIAGNOSIS — E78.5 DYSLIPIDEMIA: Chronic | ICD-10-CM

## 2024-01-30 DIAGNOSIS — I48.0 PAF (PAROXYSMAL ATRIAL FIBRILLATION) (HCC): ICD-10-CM

## 2024-01-30 DIAGNOSIS — I10 PRIMARY HYPERTENSION: ICD-10-CM

## 2024-01-30 PROCEDURE — 99214 OFFICE O/P EST MOD 30 MIN: CPT | Performed by: INTERNAL MEDICINE

## 2024-01-30 ASSESSMENT — FIBROSIS 4 INDEX: FIB4 SCORE: 1.454545454545454545

## 2024-01-31 NOTE — PROGRESS NOTES
"    Cardiology Telemedicine Visit Initial Consultation Note    Date of note:    1/30/2024    Primary Care Provider: Sudeep Saul D.O.  Referring Provider: No ref. provider found     Patient Name: Rhett Barrios   YOB: 1950  MRN:              [id number]      This evaluation was conducted via Zoom, using secure and encrypted videoconferencing technology.    The patient was at home in the [facility].  The patient's identity was confirmed and verbal consent for the telemedicine encounter was obtained.       Chief Complaint   Patient presents with    Coronary Artery Disease     F/V Dx:Coronary artery calcification seen on CAT scan    Dyslipidemia    Atrial Fibrillation       History of Present Illness: Mr. Rhett Barrios is a 74-year-old man with past medical history significant for paroxysmal atrial fibrillation, coronary artery disease/coronary calcification, hypertension, WPW, dyslipidemia, obesity who presents for routine follow-up.    Patient is known to our practice and is a patient of Dr. Jenkins.  He was last seen on 10/10/2023.  Underwent recent stress test prior to that visit which showed ischemia in the inferior wall.  Given progressive dyspnea and stress test findings, arrangements were made for him to undergo cardiac catheterization.  Coronary angiogram performed on 10/11/2023 which showed mild nonobstructive CAD with normal LV filling pressures.    Presents today for follow up. Since his procedure, he has been well overall. Denies having exertional chest pain. Denies having dyspnea. No lower extremity edema or syncope.  Patient lives in [facility] and routinely skis.  He tells me that he went skiing last week and not experience any exertional chest pain or dyspnea.  He has no major complaints today.    Cardiovascular Risk Factors:  1. Smoking status: Denies  2. Type II Diabetes Mellitus: Denies No results found for: \"HBA1C\"  3. Hypertension: Yes  4. Dyslipidemia: " "Yes No results found for: \"CHOLSTRLTOT\", \"LDL\", \"HDL\", \"TRIGLYCERIDE\"  5. Family history of early Coronary Artery Disease in a first degree relative (Male less than 55 years of age; Female less than 65 years of age): Denies    Review of systems:  As per HPI.  ROS questions obtained and are negative except as stated above.      Past Medical History:   Diagnosis Date    Back pain     Chickenpox     Coronary artery calcification seen on CAT scan 08/22/2017    Agatston score of 336 in left main, left anterior descending and circumflex predominately left anterior descending with none in the right coronary artery    Cough     Fatigue     GERD (gastroesophageal reflux disease)     [place] measles     Gilbert's syndrome     Hyperlipidemia 2010    Influenza     Kidney stone     Mumps     Nasal drainage     PAF (paroxysmal atrial fibrillation) (HCC)     remote, age 37 without recurrence, WPW found at that time, never ablated. First diagnosed at [facility], did stress testing which showed highest rate was not significant and thus they weaned him off medications.     Peripheral arterial disease (HCC) 5/12/2017    Borderline ABIs of 0.8 on the right and 0.9 on the left, 0.9 bilaterally after exercise    Pneumonia     Sputum production     Unspecified sleep apnea 09/11/2012    evaluated by Dr. Gutierrez, PMA    Taylor-Parkinson-White (WPW) syndrome     Presented with atrial fibrillation and found to have long refractory period Bright bundle at age 37 with negative coronary arteriogram then. 1st evaluated by Dr. Caal in 2010 and Dr. Doan subsequently. Referred for evaluation to Dr. Terrell at [facility], 12/2/2014,  but then decided not to go and has had no recurrent atrial fibrillation since age 37 nor any other atrial tachyarrhythmias         Past Surgical History:   Procedure Laterality Date    ARTHROSCOPY, KNEE      KNEE ARTHROSCOPY Right     KNEE ARTHROTOMY Left     VASECTOMY           Current Outpatient Medications   Medication Sig " "Dispense Refill    tadalafil (CIALIS) 5 MG tablet Take 5 mg by mouth every day.      ezetimibe (ZETIA) 10 MG Tab Take 1 Tablet by mouth every day. 100 Tablet 3    chlorthalidone (HYGROTON) 25 MG Tab Take 1 Tablet by mouth every day. 100 Tablet 3    atorvastatin (LIPITOR) 80 MG tablet Take 1 Tablet by mouth every day. (Patient taking differently: Take 80 mg by mouth every evening.) 100 Tablet 3    potassium chloride SA (KDUR) 20 MEQ Tab CR Take 2 Tablets by mouth every day. (Patient taking differently: Take 20 mEq by mouth 2 times a day.) 180 Tablet 2    famotidine (PEPCID) 40 MG Tab Take 40 mg by mouth every day.      omeprazole (PRILOSEC) 40 MG delayed-release capsule Take 40 mg by mouth every day.      aspirin EC (ECOTRIN) 81 MG TBEC Take 81 mg by mouth every day.      Coenzyme Q10 (CO Q 10) 100 MG CAPS Take 100 mg by mouth every day.      vitamin D (CHOLECALCIFEROL) 1000 UNIT TABS Take 1,000 Units by mouth every day.       No current facility-administered medications for this visit.         Allergies   Allergen Reactions    Digoxin Unspecified     \"Patient has WPW and was informed to not take this medicine.\"    Norvasc [Amlodipine]      Leg swelling         Family History   Problem Relation Age of Onset    Cancer Mother         Lymphoma    Heart Disease Father 80        CAD and PPM,  at 94         Social History     Socioeconomic History    Marital status:      Spouse name: Not on file    Number of children: Not on file    Years of education: Not on file    Highest education level: Not on file   Occupational History    Not on file   Tobacco Use    Smoking status: Never    Smokeless tobacco: Never   Vaping Use    Vaping Use: Never used   Substance and Sexual Activity    Alcohol use: Not Currently     Comment: Very rarely    Drug use: No    Sexual activity: Not on file   Other Topics Concern    Not on file   Social History Narrative         Social Determinants of Health     Financial Resource " "Strain: Not on file   Food Insecurity: Not on file   Transportation Needs: Not on file   Physical Activity: Not on file   Stress: Not on file   Social Connections: Not on file   Intimate Partner Violence: Not on file   Housing Stability: Not on file         Physical Exam:  Vitals as provided by the patient   /80   Pulse 89   Ht 1.93 m (6' 4\")   Wt 112 kg (248 lb)    Oxygen Therapy:     BP Readings from Last 4 Encounters:   01/30/24 128/80   10/18/23 (!) 148/76   10/10/23 130/70   09/19/23 128/77         Weight/BMI: Body mass index is 30.19 kg/m².  Wt Readings from Last 4 Encounters:   01/30/24 112 kg (248 lb)   10/18/23 114 kg (250 lb 7.1 oz)   10/10/23 110 kg (242 lb)   09/19/23 110 kg (242 lb)       Physical Exam:  General: No acute distress. Well nourished.   HEENT: EOM grossly intact, no scleral icterus  Neck: Trachea midline, no obvious JVD  CVS: No obvious edema  Resp: Unlabored respiratory effort, no cough or audible wheeze  Skin: No rashes in visible areas.  Neurological: No focal neurologic deficits  Psych: Appropriate affect, good judgement, well groomed      Lab Data Review:  No results found for: \"CHOLSTRLTOT\", \"LDL\", \"HDL\", \"TRIGLYCERIDE\"    Lab Results   Component Value Date/Time    SODIUM 141 10/18/2023 08:36 AM    POTASSIUM 3.4 (L) 10/18/2023 08:36 AM    CHLORIDE 105 10/18/2023 08:36 AM    CO2 26 10/18/2023 08:36 AM    GLUCOSE 117 (H) 10/18/2023 08:36 AM    BUN 18 10/18/2023 08:36 AM    CREATININE 0.90 10/18/2023 08:36 AM     Lab Results   Component Value Date/Time    ALKPHOSPHAT 107 (H) 10/18/2023 08:36 AM    ASTSGOT 20 10/18/2023 08:36 AM    ALTSGPT 25 10/18/2023 08:36 AM    TBILIRUBIN 1.5 10/18/2023 08:36 AM      Lab Results   Component Value Date/Time    WBC 7.1 10/18/2023 08:36 AM    HEMOGLOBIN 16.0 10/18/2023 08:36 AM     No results found for: \"HBA1C\"      Cardiac Imaging and Procedures Review:    EKG dated 10/18/2023: My personal interpretation is manage bradycardia, right bundle " branch block    Cardiac stress test 9/20/2023:  There is stress-induced ischemia in the inferior wall  LVEF 75% on rest, 69% on stress     Cardiac Cath 10/2023:  The left main coronary artery is patent and trifurcates into the left anterior descending, ramus intermedius, and left circumflex coronary arteries.  The left anterior descending coronary artery is a large, transapical vessel with proximal 20% disease, mid 40% disease, and distal luminal irregularities.  It supplies a moderate first diagonal branch with ostial 50% disease followed by luminal irregularities.  The ramus intermedius is a small, insignificant vessel.  The left circumflex coronary artery is a moderate, nondominant vessel with mid 40% disease and otherwise luminal irregularities.  It supplies moderate first and second diagonal branches with luminal irregularities.  The right coronary artery is a large, dominant vessel with scattered 10-20% lesions throughout its course.  Distally, it bifurcates into a large posterolateral branch and a moderate posterior descending coronary with luminal irregularities.    Assessment & Plan     1. Coronary artery disease due to calcified coronary lesion        2. Dyslipidemia        3. Primary hypertension        4. PAF (paroxysmal atrial fibrillation) ([facility])              Shared Medical Decision Making:  Mr. Rhett Barrios is a 74-year-old man with past medical history significant for paroxysmal atrial fibrillation, coronary artery disease/coronary calcification, hypertension, WPW, dyslipidemia, obesity who presents for routine follow-up.    1.  Coronary artery disease  -Underwent cardiac catheterization due to an abnormal stress test with inferior wall ischemia.  Results of cardiac catheterization reviewed.  Found to have nonobstructive CAD.  RCA with only 10 to 20% stenosis.  LAD with mid 40% stenosis and 50% stenosis involving ostial D1.  In addition, LCx only shows 40% stenosis.  Overall, reassuring angiogram  without obstructive lesions that would explain his symptoms and stress test findings.  Stress test results are false positive.  -Currently stable without exertional symptoms.  Will need aggressive cardiovascular risk factor reduction given underlying coronary artery disease.  Continue high intensity statin and Zetia therapy.  -Continue aspirin 81 mg daily.  -Not currently on beta-blocker therapy due to resting bradycardia.    2.  Dyslipidemia  -Longstanding history of hyperlipidemia.  Continue high intensity atorvastatin 80 mg daily and Zetia 10 mg daily.    3.  Primary hypertension  -Blood pressure is currently stable and well-controlled.  Currently at goal of less than 130/80 mmHg.  No changes to BP medications.  Continue chlorthalidone.    4.  Paroxysmal atrial fibrillation  -Remote history of PAF over 30 years ago.  Not currently on systemic anticoagulation due to patient preference.  Does not appear that he has had a recurrence of A-fib ever since initial diagnosis.  Continue to monitor for now.  Certainly if he were to develop recurrent A-fib, would benefit from initiation of systemic anticoagulation for long-term thromboembolism prophylaxis given his elevated [id number] score.      All of patient's excellent questions were answered to the best of my knowledge and to The patient's satisfaction.  It was a pleasure seeing Mr. Rhett Barrios in my clinic today. Return in about 6 months (around 7/30/2024) for Coronary artery disease, Hypertension. Patient is aware to call the cardiology clinic with any questions or concerns.      José Manuel Victoria MD  Cardiologist, [facility]  [place] [facility], [facility]  [address] [address]  Phone: [phone]  Fax: [phone]

## 2024-03-08 DIAGNOSIS — E87.6 HYPOKALEMIA: ICD-10-CM

## 2024-03-08 RX ORDER — POTASSIUM CHLORIDE 20 MEQ/1
40 TABLET, EXTENDED RELEASE ORAL DAILY
Qty: 180 TABLET | Refills: 0 | Status: SHIPPED | OUTPATIENT
Start: 2024-03-08

## 2024-10-23 DIAGNOSIS — E78.00 HYPERCHOLESTEROLEMIA: ICD-10-CM

## 2024-10-23 DIAGNOSIS — R73.01 IMPAIRED FASTING GLUCOSE: ICD-10-CM

## 2024-10-23 DIAGNOSIS — I48.0 PAF (PAROXYSMAL ATRIAL FIBRILLATION) (HCC): ICD-10-CM

## 2024-10-23 NOTE — TELEPHONE ENCOUNTER
Is the patient due for a refill? Yes    Was the patient seen the past year? Yes    Date of last office visit: 01.30.2024    Does the patient have an upcoming appointment?  No    Provider to refill:MK    Does the patient have [facility] and need 100-day supply? (This applies to ALL medications) Patient does not have S[facility]

## 2024-11-02 RX ORDER — ATORVASTATIN CALCIUM 80 MG/1
80 TABLET, FILM COATED ORAL
Qty: 90 TABLET | Refills: 1 | Status: SHIPPED | OUTPATIENT
Start: 2024-11-02